# Patient Record
Sex: MALE | Race: WHITE | Employment: FULL TIME | URBAN - METROPOLITAN AREA
[De-identification: names, ages, dates, MRNs, and addresses within clinical notes are randomized per-mention and may not be internally consistent; named-entity substitution may affect disease eponyms.]

---

## 2019-03-28 ENCOUNTER — HOSPITAL ENCOUNTER (OUTPATIENT)
Dept: PREADMISSION TESTING | Age: 49
Discharge: HOME | End: 2019-04-01
Payer: COMMERCIAL

## 2019-03-28 VITALS
BODY MASS INDEX: 29 KG/M2 | DIASTOLIC BLOOD PRESSURE: 86 MMHG | HEART RATE: 92 BPM | WEIGHT: 184.8 LBS | OXYGEN SATURATION: 96 % | TEMPERATURE: 97.9 F | HEIGHT: 67 IN | SYSTOLIC BLOOD PRESSURE: 154 MMHG | RESPIRATION RATE: 16 BRPM

## 2019-03-28 PROBLEM — S83.242A TEAR OF MEDIAL MENISCUS OF LEFT KNEE: Status: ACTIVE | Noted: 2019-03-28

## 2019-03-28 LAB
ANION GAP SERPL CALCULATED.3IONS-SCNC: 13 MEQ/L (ref 9–15)
BUN BLDV-MCNC: 21 MG/DL (ref 6–20)
CALCIUM SERPL-MCNC: 9.2 MG/DL (ref 8.5–9.9)
CHLORIDE BLD-SCNC: 102 MEQ/L (ref 95–107)
CO2: 27 MEQ/L (ref 20–31)
CREAT SERPL-MCNC: 0.67 MG/DL (ref 0.7–1.2)
EKG ATRIAL RATE: 67 BPM
EKG P AXIS: 22 DEGREES
EKG P-R INTERVAL: 192 MS
EKG Q-T INTERVAL: 408 MS
EKG QRS DURATION: 98 MS
EKG QTC CALCULATION (BAZETT): 431 MS
EKG R AXIS: 31 DEGREES
EKG T AXIS: 17 DEGREES
EKG VENTRICULAR RATE: 67 BPM
GFR AFRICAN AMERICAN: >60
GFR NON-AFRICAN AMERICAN: >60
GLUCOSE BLD-MCNC: 147 MG/DL (ref 70–99)
HCT VFR BLD CALC: 40 % (ref 42–52)
HEMOGLOBIN: 14 G/DL (ref 14–18)
MCH RBC QN AUTO: 33.2 PG (ref 27–31.3)
MCHC RBC AUTO-ENTMCNC: 34.9 % (ref 33–37)
MCV RBC AUTO: 95.2 FL (ref 80–100)
PDW BLD-RTO: 12.9 % (ref 11.5–14.5)
PLATELET # BLD: 223 K/UL (ref 130–400)
POTASSIUM SERPL-SCNC: 3.4 MEQ/L (ref 3.4–4.9)
RBC # BLD: 4.2 M/UL (ref 4.7–6.1)
SODIUM BLD-SCNC: 142 MEQ/L (ref 135–144)
WBC # BLD: 5.3 K/UL (ref 4.8–10.8)

## 2019-03-28 PROCEDURE — 85027 COMPLETE CBC AUTOMATED: CPT

## 2019-03-28 PROCEDURE — 80048 BASIC METABOLIC PNL TOTAL CA: CPT

## 2019-03-28 PROCEDURE — 93005 ELECTROCARDIOGRAM TRACING: CPT

## 2019-03-28 RX ORDER — CARVEDILOL 6.25 MG/1
6.25 TABLET ORAL 2 TIMES DAILY WITH MEALS
COMMUNITY

## 2019-03-28 RX ORDER — AMLODIPINE BESYLATE 10 MG/1
10 TABLET ORAL DAILY
COMMUNITY

## 2019-03-28 RX ORDER — CEFAZOLIN SODIUM 2 G/50ML
2 SOLUTION INTRAVENOUS ONCE
Status: CANCELLED | OUTPATIENT
Start: 2019-04-09

## 2019-03-28 RX ORDER — INDOMETHACIN 75 MG/1
75 CAPSULE, EXTENDED RELEASE ORAL DAILY
COMMUNITY

## 2019-03-28 RX ORDER — EPLERENONE 50 MG/1
50 TABLET, FILM COATED ORAL DAILY
COMMUNITY

## 2019-03-28 RX ORDER — SODIUM CHLORIDE 0.9 % (FLUSH) 0.9 %
10 SYRINGE (ML) INJECTION PRN
Status: CANCELLED | OUTPATIENT
Start: 2019-04-09

## 2019-03-28 RX ORDER — ZOLPIDEM TARTRATE 10 MG/1
TABLET ORAL NIGHTLY PRN
COMMUNITY

## 2019-03-28 RX ORDER — SODIUM CHLORIDE, SODIUM LACTATE, POTASSIUM CHLORIDE, CALCIUM CHLORIDE 600; 310; 30; 20 MG/100ML; MG/100ML; MG/100ML; MG/100ML
INJECTION, SOLUTION INTRAVENOUS CONTINUOUS
Status: CANCELLED | OUTPATIENT
Start: 2019-04-09

## 2019-03-28 RX ORDER — ATORVASTATIN CALCIUM 20 MG/1
20 TABLET, FILM COATED ORAL DAILY
COMMUNITY

## 2019-03-28 RX ORDER — SODIUM CHLORIDE 0.9 % (FLUSH) 0.9 %
10 SYRINGE (ML) INJECTION EVERY 12 HOURS SCHEDULED
Status: CANCELLED | OUTPATIENT
Start: 2019-04-09

## 2019-03-28 RX ORDER — LISINOPRIL 40 MG/1
40 TABLET ORAL DAILY
COMMUNITY

## 2019-03-28 RX ORDER — LIDOCAINE HYDROCHLORIDE 10 MG/ML
1 INJECTION, SOLUTION EPIDURAL; INFILTRATION; INTRACAUDAL; PERINEURAL
Status: CANCELLED | OUTPATIENT
Start: 2019-04-09 | End: 2019-04-09

## 2019-03-28 RX ORDER — CYCLOBENZAPRINE HCL 10 MG
10 TABLET ORAL 3 TIMES DAILY PRN
COMMUNITY

## 2019-03-28 RX ORDER — NAPROXEN SODIUM 220 MG
440 TABLET ORAL DAILY
COMMUNITY

## 2019-03-28 NOTE — PROGRESS NOTES
Dr. Catina Duong to complete H&P  Cardiology clearance to be completed per Dr. Kali Luz.    EKG on chart

## 2019-03-29 PROCEDURE — 93010 ELECTROCARDIOGRAM REPORT: CPT | Performed by: INTERNAL MEDICINE

## 2019-04-09 ENCOUNTER — ANESTHESIA (OUTPATIENT)
Dept: OPERATING ROOM | Age: 49
End: 2019-04-09
Payer: COMMERCIAL

## 2019-04-09 ENCOUNTER — HOSPITAL ENCOUNTER (OUTPATIENT)
Age: 49
Setting detail: OUTPATIENT SURGERY
Discharge: HOME | End: 2019-04-09
Attending: ORTHOPAEDIC SURGERY | Admitting: ORTHOPAEDIC SURGERY
Payer: COMMERCIAL

## 2019-04-09 ENCOUNTER — ANESTHESIA EVENT (OUTPATIENT)
Dept: OPERATING ROOM | Age: 49
End: 2019-04-09
Payer: COMMERCIAL

## 2019-04-09 VITALS — DIASTOLIC BLOOD PRESSURE: 55 MMHG | SYSTOLIC BLOOD PRESSURE: 96 MMHG | TEMPERATURE: 95.4 F | OXYGEN SATURATION: 95 %

## 2019-04-09 VITALS
OXYGEN SATURATION: 98 % | HEART RATE: 58 BPM | TEMPERATURE: 97.8 F | BODY MASS INDEX: 28.88 KG/M2 | RESPIRATION RATE: 12 BRPM | HEIGHT: 67 IN | WEIGHT: 184 LBS | DIASTOLIC BLOOD PRESSURE: 93 MMHG | SYSTOLIC BLOOD PRESSURE: 149 MMHG

## 2019-04-09 PROCEDURE — 7100000000 HC PACU RECOVERY - FIRST 15 MIN: Performed by: ORTHOPAEDIC SURGERY

## 2019-04-09 PROCEDURE — 3700000000 HC ANESTHESIA ATTENDED CARE: Performed by: ORTHOPAEDIC SURGERY

## 2019-04-09 PROCEDURE — 6360000002 HC RX W HCPCS: Performed by: NURSE PRACTITIONER

## 2019-04-09 PROCEDURE — 3700000001 HC ADD 15 MINUTES (ANESTHESIA): Performed by: ORTHOPAEDIC SURGERY

## 2019-04-09 PROCEDURE — 7100000001 HC PACU RECOVERY - ADDTL 15 MIN: Performed by: ORTHOPAEDIC SURGERY

## 2019-04-09 PROCEDURE — 2709999900 HC NON-CHARGEABLE SUPPLY: Performed by: ORTHOPAEDIC SURGERY

## 2019-04-09 PROCEDURE — 2500000003 HC RX 250 WO HCPCS: Performed by: ORTHOPAEDIC SURGERY

## 2019-04-09 PROCEDURE — 2580000003 HC RX 258

## 2019-04-09 PROCEDURE — 2500000003 HC RX 250 WO HCPCS: Performed by: NURSE PRACTITIONER

## 2019-04-09 PROCEDURE — 7100000011 HC PHASE II RECOVERY - ADDTL 15 MIN: Performed by: ORTHOPAEDIC SURGERY

## 2019-04-09 PROCEDURE — 3600000014 HC SURGERY LEVEL 4 ADDTL 15MIN: Performed by: ORTHOPAEDIC SURGERY

## 2019-04-09 PROCEDURE — 6370000000 HC RX 637 (ALT 250 FOR IP): Performed by: ANESTHESIOLOGY

## 2019-04-09 PROCEDURE — 6360000002 HC RX W HCPCS: Performed by: NURSE ANESTHETIST, CERTIFIED REGISTERED

## 2019-04-09 PROCEDURE — 2580000003 HC RX 258: Performed by: NURSE PRACTITIONER

## 2019-04-09 PROCEDURE — 3600000004 HC SURGERY LEVEL 4 BASE: Performed by: ORTHOPAEDIC SURGERY

## 2019-04-09 PROCEDURE — 2580000003 HC RX 258: Performed by: ORTHOPAEDIC SURGERY

## 2019-04-09 PROCEDURE — 7100000010 HC PHASE II RECOVERY - FIRST 15 MIN: Performed by: ORTHOPAEDIC SURGERY

## 2019-04-09 RX ORDER — ONDANSETRON 2 MG/ML
4 INJECTION INTRAMUSCULAR; INTRAVENOUS
Status: DISCONTINUED | OUTPATIENT
Start: 2019-04-09 | End: 2019-04-09 | Stop reason: HOSPADM

## 2019-04-09 RX ORDER — LIDOCAINE HYDROCHLORIDE 10 MG/ML
1 INJECTION, SOLUTION EPIDURAL; INFILTRATION; INTRACAUDAL; PERINEURAL
Status: COMPLETED | OUTPATIENT
Start: 2019-04-09 | End: 2019-04-09

## 2019-04-09 RX ORDER — MORPHINE SULFATE 4 MG/ML
4 INJECTION, SOLUTION INTRAMUSCULAR; INTRAVENOUS
Status: DISCONTINUED | OUTPATIENT
Start: 2019-04-09 | End: 2019-04-09 | Stop reason: HOSPADM

## 2019-04-09 RX ORDER — HYDROCODONE BITARTRATE AND ACETAMINOPHEN 5; 325 MG/1; MG/1
1 TABLET ORAL PRN
Status: COMPLETED | OUTPATIENT
Start: 2019-04-09 | End: 2019-04-09

## 2019-04-09 RX ORDER — DIPHENHYDRAMINE HYDROCHLORIDE 50 MG/ML
12.5 INJECTION INTRAMUSCULAR; INTRAVENOUS
Status: DISCONTINUED | OUTPATIENT
Start: 2019-04-09 | End: 2019-04-09 | Stop reason: HOSPADM

## 2019-04-09 RX ORDER — DEXAMETHASONE SODIUM PHOSPHATE 10 MG/ML
INJECTION INTRAMUSCULAR; INTRAVENOUS PRN
Status: DISCONTINUED | OUTPATIENT
Start: 2019-04-09 | End: 2019-04-09 | Stop reason: SDUPTHER

## 2019-04-09 RX ORDER — SODIUM CHLORIDE 0.9 % (FLUSH) 0.9 %
10 SYRINGE (ML) INJECTION EVERY 12 HOURS SCHEDULED
Status: DISCONTINUED | OUTPATIENT
Start: 2019-04-09 | End: 2019-04-09 | Stop reason: HOSPADM

## 2019-04-09 RX ORDER — ONDANSETRON 2 MG/ML
4 INJECTION INTRAMUSCULAR; INTRAVENOUS EVERY 6 HOURS PRN
Status: DISCONTINUED | OUTPATIENT
Start: 2019-04-09 | End: 2019-04-09 | Stop reason: HOSPADM

## 2019-04-09 RX ORDER — SODIUM CHLORIDE, SODIUM LACTATE, POTASSIUM CHLORIDE, CALCIUM CHLORIDE 600; 310; 30; 20 MG/100ML; MG/100ML; MG/100ML; MG/100ML
INJECTION, SOLUTION INTRAVENOUS
Status: COMPLETED
Start: 2019-04-09 | End: 2019-04-09

## 2019-04-09 RX ORDER — LIDOCAINE HYDROCHLORIDE 10 MG/ML
INJECTION, SOLUTION EPIDURAL; INFILTRATION; INTRACAUDAL; PERINEURAL PRN
Status: DISCONTINUED | OUTPATIENT
Start: 2019-04-09 | End: 2019-04-09 | Stop reason: ALTCHOICE

## 2019-04-09 RX ORDER — METOCLOPRAMIDE HYDROCHLORIDE 5 MG/ML
10 INJECTION INTRAMUSCULAR; INTRAVENOUS
Status: DISCONTINUED | OUTPATIENT
Start: 2019-04-09 | End: 2019-04-09 | Stop reason: HOSPADM

## 2019-04-09 RX ORDER — HYDROCODONE BITARTRATE AND ACETAMINOPHEN 5; 325 MG/1; MG/1
2 TABLET ORAL PRN
Status: COMPLETED | OUTPATIENT
Start: 2019-04-09 | End: 2019-04-09

## 2019-04-09 RX ORDER — MAGNESIUM HYDROXIDE 1200 MG/15ML
LIQUID ORAL CONTINUOUS PRN
Status: COMPLETED | OUTPATIENT
Start: 2019-04-09 | End: 2019-04-09

## 2019-04-09 RX ORDER — MEPERIDINE HYDROCHLORIDE 25 MG/ML
12.5 INJECTION INTRAMUSCULAR; INTRAVENOUS; SUBCUTANEOUS EVERY 5 MIN PRN
Status: DISCONTINUED | OUTPATIENT
Start: 2019-04-09 | End: 2019-04-09 | Stop reason: HOSPADM

## 2019-04-09 RX ORDER — ONDANSETRON 2 MG/ML
INJECTION INTRAMUSCULAR; INTRAVENOUS PRN
Status: DISCONTINUED | OUTPATIENT
Start: 2019-04-09 | End: 2019-04-09 | Stop reason: SDUPTHER

## 2019-04-09 RX ORDER — FENTANYL CITRATE 50 UG/ML
50 INJECTION, SOLUTION INTRAMUSCULAR; INTRAVENOUS EVERY 10 MIN PRN
Status: DISCONTINUED | OUTPATIENT
Start: 2019-04-09 | End: 2019-04-09 | Stop reason: HOSPADM

## 2019-04-09 RX ORDER — LIDOCAINE HYDROCHLORIDE 10 MG/ML
1 INJECTION, SOLUTION EPIDURAL; INFILTRATION; INTRACAUDAL; PERINEURAL
Status: DISCONTINUED | OUTPATIENT
Start: 2019-04-09 | End: 2019-04-09 | Stop reason: HOSPADM

## 2019-04-09 RX ORDER — MIDAZOLAM HYDROCHLORIDE 1 MG/ML
INJECTION INTRAMUSCULAR; INTRAVENOUS
Status: DISCONTINUED
Start: 2019-04-09 | End: 2019-04-09 | Stop reason: HOSPADM

## 2019-04-09 RX ORDER — SODIUM CHLORIDE 0.9 % (FLUSH) 0.9 %
10 SYRINGE (ML) INJECTION PRN
Status: DISCONTINUED | OUTPATIENT
Start: 2019-04-09 | End: 2019-04-09 | Stop reason: HOSPADM

## 2019-04-09 RX ORDER — OXYCODONE HYDROCHLORIDE AND ACETAMINOPHEN 5; 325 MG/1; MG/1
2 TABLET ORAL EVERY 4 HOURS PRN
Status: DISCONTINUED | OUTPATIENT
Start: 2019-04-09 | End: 2019-04-09 | Stop reason: HOSPADM

## 2019-04-09 RX ORDER — PROPOFOL 10 MG/ML
INJECTION, EMULSION INTRAVENOUS PRN
Status: DISCONTINUED | OUTPATIENT
Start: 2019-04-09 | End: 2019-04-09 | Stop reason: SDUPTHER

## 2019-04-09 RX ORDER — DOCUSATE SODIUM 100 MG/1
100 CAPSULE, LIQUID FILLED ORAL 2 TIMES DAILY
Status: DISCONTINUED | OUTPATIENT
Start: 2019-04-09 | End: 2019-04-09 | Stop reason: HOSPADM

## 2019-04-09 RX ORDER — MORPHINE SULFATE 2 MG/ML
2 INJECTION, SOLUTION INTRAMUSCULAR; INTRAVENOUS
Status: DISCONTINUED | OUTPATIENT
Start: 2019-04-09 | End: 2019-04-09 | Stop reason: HOSPADM

## 2019-04-09 RX ORDER — OXYCODONE HYDROCHLORIDE AND ACETAMINOPHEN 5; 325 MG/1; MG/1
1 TABLET ORAL EVERY 4 HOURS PRN
Status: DISCONTINUED | OUTPATIENT
Start: 2019-04-09 | End: 2019-04-09 | Stop reason: HOSPADM

## 2019-04-09 RX ORDER — SODIUM CHLORIDE, SODIUM LACTATE, POTASSIUM CHLORIDE, CALCIUM CHLORIDE 600; 310; 30; 20 MG/100ML; MG/100ML; MG/100ML; MG/100ML
INJECTION, SOLUTION INTRAVENOUS CONTINUOUS
Status: DISCONTINUED | OUTPATIENT
Start: 2019-04-09 | End: 2019-04-09 | Stop reason: HOSPADM

## 2019-04-09 RX ORDER — FENTANYL CITRATE 50 UG/ML
INJECTION, SOLUTION INTRAMUSCULAR; INTRAVENOUS PRN
Status: DISCONTINUED | OUTPATIENT
Start: 2019-04-09 | End: 2019-04-09 | Stop reason: SDUPTHER

## 2019-04-09 RX ORDER — CEFAZOLIN SODIUM 2 G/50ML
2 SOLUTION INTRAVENOUS ONCE
Status: COMPLETED | OUTPATIENT
Start: 2019-04-09 | End: 2019-04-09

## 2019-04-09 RX ADMIN — PROPOFOL 200 MG: 10 INJECTION, EMULSION INTRAVENOUS at 07:36

## 2019-04-09 RX ADMIN — LIDOCAINE HYDROCHLORIDE 0.1 ML: 10 INJECTION, SOLUTION EPIDURAL; INFILTRATION; INTRACAUDAL; PERINEURAL at 06:34

## 2019-04-09 RX ADMIN — SODIUM CHLORIDE, POTASSIUM CHLORIDE, SODIUM LACTATE AND CALCIUM CHLORIDE 1000 ML: 600; 310; 30; 20 INJECTION, SOLUTION INTRAVENOUS at 06:35

## 2019-04-09 RX ADMIN — ONDANSETRON 4 MG: 2 INJECTION INTRAMUSCULAR; INTRAVENOUS at 07:42

## 2019-04-09 RX ADMIN — SODIUM CHLORIDE, POTASSIUM CHLORIDE, SODIUM LACTATE AND CALCIUM CHLORIDE: 600; 310; 30; 20 INJECTION, SOLUTION INTRAVENOUS at 08:05

## 2019-04-09 RX ADMIN — DEXAMETHASONE SODIUM PHOSPHATE 10 MG: 10 INJECTION INTRAMUSCULAR; INTRAVENOUS at 07:42

## 2019-04-09 RX ADMIN — CEFAZOLIN SODIUM 2 G: 2 SOLUTION INTRAVENOUS at 07:39

## 2019-04-09 RX ADMIN — FENTANYL CITRATE 25 MCG: 50 INJECTION, SOLUTION INTRAMUSCULAR; INTRAVENOUS at 07:40

## 2019-04-09 RX ADMIN — SODIUM CHLORIDE, POTASSIUM CHLORIDE, SODIUM LACTATE AND CALCIUM CHLORIDE: 600; 310; 30; 20 INJECTION, SOLUTION INTRAVENOUS at 07:29

## 2019-04-09 RX ADMIN — FENTANYL CITRATE 50 MCG: 50 INJECTION, SOLUTION INTRAMUSCULAR; INTRAVENOUS at 07:36

## 2019-04-09 RX ADMIN — LIDOCAINE HYDROCHLORIDE 80 MG: 20 INJECTION, SOLUTION INTRAVENOUS at 07:36

## 2019-04-09 RX ADMIN — FENTANYL CITRATE 25 MCG: 50 INJECTION, SOLUTION INTRAMUSCULAR; INTRAVENOUS at 07:38

## 2019-04-09 RX ADMIN — HYDROCODONE BITARTRATE AND ACETAMINOPHEN 1 TABLET: 5; 325 TABLET ORAL at 09:50

## 2019-04-09 ASSESSMENT — PULMONARY FUNCTION TESTS
PIF_VALUE: 9
PIF_VALUE: 5
PIF_VALUE: 0
PIF_VALUE: 2
PIF_VALUE: 14
PIF_VALUE: 2
PIF_VALUE: 3
PIF_VALUE: 4
PIF_VALUE: 5
PIF_VALUE: 3
PIF_VALUE: 2
PIF_VALUE: 3
PIF_VALUE: 4
PIF_VALUE: 0
PIF_VALUE: 6
PIF_VALUE: 4
PIF_VALUE: 4
PIF_VALUE: 19
PIF_VALUE: 2
PIF_VALUE: 3
PIF_VALUE: 11
PIF_VALUE: 5
PIF_VALUE: 5
PIF_VALUE: 11
PIF_VALUE: 4
PIF_VALUE: 5
PIF_VALUE: 3
PIF_VALUE: 3
PIF_VALUE: 12
PIF_VALUE: 6
PIF_VALUE: 5
PIF_VALUE: 0
PIF_VALUE: 2
PIF_VALUE: 0
PIF_VALUE: 1
PIF_VALUE: 1
PIF_VALUE: 7
PIF_VALUE: 3
PIF_VALUE: 5

## 2019-04-09 ASSESSMENT — PAIN SCALES - GENERAL: PAINLEVEL_OUTOF10: 6

## 2019-04-09 NOTE — OP NOTE
Janine [address]                      [address], [address] [facility]                                OPERATIVE REPORT    PATIENT NAME: Jorge Mtz                     :        1970  MED REC NO:   [id number]                            ROOM:  ACCOUNT NO:   [de-identified]                           ADMIT DATE: 2019  PROVIDER:     Breana Vasquez MD    DATE OF PROCEDURE:  2019    LOCATION:  [facility]. PREOPERATIVE DIAGNOSES:  1. Left knee medial meniscal tear. 2.  Left knee DJD. POSTOPERATIVE DIAGNOSES:  1. Left knee medial meniscal tear. 2.  Left knee DJD. PROCEDURES PERFORMED:  1. Left knee arthroscopy with partial medial meniscectomy. 2.  Left knee arthroscopic chondroplasty of patellofemoral joint. SURGEON:  Breana Vasquez MD.    ASSISTANT:  Cooper Briscoe PA-C was present throughout the entire case. Given the nature of the disease process and the procedure, a skilled  surgical first assistant was necessary during the case. The assistant  was necessary to hold retractors and manipulate the extremity during the  procedure. A certified scrub tech was at the back table managing the  instruments and supplies for the surgical case. ANESTHETIC:  LMA plus local.    COMPLICATIONS:  None. INDICATIONS:  The patient is a 78-year-old male with history of left  knee pain. He had recurrent mechanical symptoms and effusion, and  wished to proceed with the arthroscopic evaluation of his meniscal tear. The risks and benefits of surgery were noted with the patient and  family. These include infection, stiffness, nerve damage, continued  pain, as well as need for subsequent operations. We specifically  discussed the failure of arthroscopy to treat any of his underlying  arthritic condition. Understanding these options and limitations, he  elected to proceed.   Informed consent was obtained prior to arrival in  the operating room.    OPERATIVE PROCEDURE:  Upon arrival, the patient was identified. He was  transported from the stretcher to the operating table and placed in  supine position. An LMA was administered by the Anesthesia staff. Prior to the start of the case, 2 gm of Ancef was given intravenously. No tourniquet was used during the case. His left leg was prepped and  draped in the usual sterile fashion. A standard inferolateral  arthroscopy portal was established and the arthroscope was inserted into  the suprapatellar space. The patella  had some grade I changes and a  small area of grade II change laterally. There was some grade II and a  small area of grade III change centrally in the trochlea. There was  some grade II change noted up in majority of the medial femoral condyle. There was some grade I change in the medial tibial plateau. There was a  tear at the posterior aspect of the medial meniscus with the small flap  evident. Under direct visualization, an inferomedial portal was  established. The medial meniscal tear was resected back to stable issue  using upbiting forceps and shaver. Approximately 20%-25% of the medial  meniscus was removed. The anterior and posterior horn attachments  remained intact. The posterior compartment was normal.  The ACL and PCL  were intact. The lateral femoral condyle, lateral tibial plateau, and  lateral meniscus were normal.  A gentle chondroplasty was performed  medially as well as in the patellofemoral joint, removing any loose  fragments. The knee was then copiously irrigated through the shaver and  suctioned dry. Portal sites were closed with 3-0 nylon suture. All  sponge and needle counts were correct prior to the closure. A sterile  dressing was applied. He was awoken from his anesthetic and taken to  the recovery room in stable condition.         Joshua Magana MD    D: 04/09/2019 8:18:37       T: 04/09/2019 12:35:46     [email or web address]  Job#: [id number] Doc#: [id number]    CC:

## 2019-04-09 NOTE — ANESTHESIA POSTPROCEDURE EVALUATION
Department of Anesthesiology  Postprocedure Note    Patient: Hayley Sawyer  MRN: [id number]  YOB: 1970  Date of evaluation: 4/9/2019  Time:  8:15 AM     Procedure Summary     Date:  04/09/19 Room / Location:  [facility] OR [address] [place] [address] [place]    Anesthesia Start:  0729 Anesthesia Stop:      Procedure:  LEFT ARTHROSCOPY/ MEDIAL MENISCECTOMY KNEE, SUPINE (Left ) Diagnosis:  (LEFT KNEE MEDIAL MENISCUS TEAR)    Surgeon:  Yue Hicks MD Responsible Provider:  María Moreland MD    Anesthesia Type:  general ASA Status:  2          Anesthesia Type: general    Sherita Phase I: Sherita Score: 10    Sherita Phase II:      Last vitals: Reviewed and per EMR flowsheets.        Anesthesia Post Evaluation    Patient location during evaluation: bedside  Patient participation: complete - patient participated  Level of consciousness: awake and awake and alert  Pain score: 0  Airway patency: patent  Nausea & Vomiting: no nausea and no vomiting  Complications: no  Cardiovascular status: blood pressure returned to baseline and hemodynamically stable  Respiratory status: acceptable  Hydration status: euvolemic

## 2019-04-09 NOTE — ANESTHESIA PRE PROCEDURE
Department of Anesthesiology  Preprocedure Note       Name:  Ilsa Seip   Age:  50 y.o.  :  1970                                          MRN:  [id number]         Date:  2019      Surgeon: Karina Hunter):  Margo Hall MD    Procedure: LEFT ARTHROSCOPY/ MEDIAL MENISCECTOMY KNEE, SUPINE (Left )    Medications prior to admission:   Prior to Admission medications    Medication Sig Start Date End Date Taking? Authorizing Provider   amLODIPine (NORVASC) 10 MG tablet Take 10 mg by mouth daily   Yes Historical Provider, MD   atorvastatin (LIPITOR) 20 MG tablet Take 20 mg by mouth daily   Yes Historical Provider, MD   carvedilol (COREG) 6.25 MG tablet Take 6.25 mg by mouth 2 times daily (with meals)   Yes Historical Provider, MD   cyclobenzaprine (FLEXERIL) 10 MG tablet Take 10 mg by mouth 3 times daily as needed for Muscle spasms   Yes Historical Provider, MD   lisinopril (PRINIVIL;ZESTRIL) 40 MG tablet Take 40 mg by mouth daily   Yes Historical Provider, MD   zolpidem (AMBIEN) 10 MG tablet Take by mouth nightly as needed for Sleep.    Yes Historical Provider, MD   indomethacin (INDOCIN SR) 75 MG extended release capsule Take 75 mg by mouth daily   Yes Historical Provider, MD   eplerenone (INSPRA) 50 MG tablet Take 50 mg by mouth daily   Yes Historical Provider, MD   naproxen sodium (ALEVE) 220 MG tablet Take 440 mg by mouth daily   Yes Historical Provider, MD       Current medications:    Current Facility-Administered Medications   Medication Dose Route Frequency Provider Last Rate Last Dose    lactated ringers infusion   Intravenous Continuous FABIENNE Jha  mL/hr at 19 0635 1,000 mL at 19 0635    sodium chloride flush 0.9 % injection 10 mL  10 mL Intravenous 2 times per day FABIENNE Yan CNP        sodium chloride flush 0.9 % injection 10 mL  10 mL Intravenous PRN FABIENNE Jha CNP        ceFAZolin (ANCEF) 2 g in dextrose 3 % 50 mL IVPB (duplex)  2 g Intravenous Once [employer], FABIENNE - CNP        fentaNYL (SUBLIMAZE) injection 50 mcg  50 mcg Intravenous Q10 Min PRN Anne Brice MD        HYDROmorphone (DILAUDID) injection 0.5 mg  0.5 mg Intravenous Q10 Min PRN Anne Brice MD        HYDROcodone-acetaminophen [facility]) 5-325 MG per tablet 1 tablet  1 tablet Oral PRN Anne Brice MD        Or    HYDROcodone-acetaminophen [facility]) 5-325 MG per tablet 2 tablet  2 tablet Oral PRN Anne Brice MD        diphenhydrAMINE (BENADRYL) injection 12.5 mg  12.5 mg Intravenous Once PRN Anne Brice MD        ondansetron [facility]) injection 4 mg  4 mg Intravenous Once PRN Anne Brice MD        metoclopramide [facility]) injection 10 mg  10 mg Intravenous Once PRN Anne Brice MD        meperidine (DEMEROL) injection 12.5 mg  12.5 mg Intravenous Q5 Min PRN Anne Brice MD        lactated ringers infusion   Intravenous Continuous Anne Brice MD        sodium chloride flush 0.9 % injection 10 mL  10 mL Intravenous 2 times per day Anne Brice MD        sodium chloride flush 0.9 % injection 10 mL  10 mL Intravenous PRN Anne Brice MD        lidocaine PF 1 % injection 1 mL  1 mL Intradermal Once PRN Anne Brice MD           Allergies:     Allergies   Allergen Reactions    Vicodin [Hydrocodone-Acetaminophen] Nausea Only       Problem List:    Patient Active Problem List   Diagnosis Code    Tear of medial meniscus of left knee G24.376E       Past Medical History:        Diagnosis Date    Arthritis     Hyperlipidemia     on meds x 6 months    Hypertension     on meds > 20 yrs       Past Surgical History:        Procedure Laterality Date    COLONOSCOPY  2018    KIDNEY STONE SURGERY Right 2003       Social History:    Social History     Tobacco Use    Smoking status: Former Smoker     Start date: 1979     Last attempt to quit: 2018     Years since quittin.4    Smokeless tobacco: Never Used   Substance Use Topics    Alcohol use: Yes     Comment: twice a week                                Counseling given: Not Answered      Vital Signs (Current):   Vitals:    19 0610   BP: (!) 141/89   Pulse: 64   Resp: 20   Temp: 98.3 °F (36.8 °C)   TempSrc: Temporal   SpO2: 98%   Weight: 184 lb (83.5 kg)   Height: 5' 6.75\" (1.695 m)                                              BP Readings from Last 3 Encounters:   19 (!) 141/89   19 (!) 154/86       NPO Status: Time of last liquid consumption: 0000                        Time of last solid consumption: 0000                        Date of last liquid consumption: 19                        Date of last solid food consumption: 19    BMI:   Wt Readings from Last 3 Encounters:   19 184 lb (83.5 kg)   19 184 lb 12.8 oz (83.8 kg)     Body mass index is 29.03 kg/m². CBC:   Lab Results   Component Value Date    WBC 5.3 2019    RBC 4.20 2019    HGB 14.0 2019    HCT 40.0 2019    MCV 95.2 2019    RDW 12.9 2019     2019       CMP:   Lab Results   Component Value Date     2019    K 3.4 2019     2019    CO2 27 2019    BUN 21 2019    CREATININE 0.67 2019    GFRAA >60.0 2019    LABGLOM >60.0 2019    GLUCOSE 147 2019    CALCIUM 9.2 2019       POC Tests: No results for input(s): POCGLU, POCNA, POCK, POCCL, POCBUN, POCHEMO, POCHCT in the last 72 hours.     Coags: No results found for: PROTIME, INR, APTT    HCG (If Applicable): No results found for: PREGTESTUR, PREGSERUM, HCG, HCGQUANT     ABGs: No results found for: PHART, PO2ART, MOT1HVM, OCS9QLM, BEART, D4HXHXEH     Type & Screen (If Applicable):  No results found for: LABABO, [address]    Anesthesia Evaluation  Patient summary reviewed and Nursing notes reviewed no history of anesthetic complications: Airway: Mallampati: II  TM distance: >3 FB   Neck ROM: full  Mouth opening: > = 3 FB Dental: normal exam         Pulmonary:Negative Pulmonary ROS and normal exam                               Cardiovascular:    (+) hypertension: no interval change,       ECG reviewed               Beta Blocker:  Dose within 24 Hrs         Neuro/Psych:   Negative Neuro/Psych ROS              GI/Hepatic/Renal: Neg GI/Hepatic/Renal ROS            Endo/Other: Negative Endo/Other ROS             Pt had PAT visit. Abdominal:           Vascular: negative vascular ROS. Anesthesia Plan      general     ASA 2       Induction: intravenous. MIPS: Postoperative opioids intended and Prophylactic antiemetics administered. Anesthetic plan and risks discussed with patient. Plan discussed with CRNA.     Attending anesthesiologist reviewed and agrees with Pre Eval content              Nimisha Aguila MD   4/9/2019

## 2023-10-18 PROBLEM — R94.31 ABNORMAL EKG: Status: ACTIVE | Noted: 2023-10-18

## 2023-10-18 PROBLEM — I10 HYPERTENSION: Status: ACTIVE | Noted: 2023-10-18

## 2023-10-18 PROBLEM — T50.905A DRUG-INDUCED GYNECOMASTIA: Status: ACTIVE | Noted: 2023-10-18

## 2023-10-18 PROBLEM — N62 DRUG-INDUCED GYNECOMASTIA: Status: ACTIVE | Noted: 2023-10-18

## 2023-10-18 PROBLEM — G47.30 SLEEP APNEA: Status: ACTIVE | Noted: 2023-10-18

## 2023-10-18 PROBLEM — R53.83 FATIGUE: Status: ACTIVE | Noted: 2023-10-18

## 2023-10-18 PROBLEM — R79.89 ABNORMAL ALDOSTERONE TO RENIN RATIO: Status: ACTIVE | Noted: 2023-10-18

## 2023-10-18 RX ORDER — POTASSIUM CHLORIDE 20 MEQ/1
2 TABLET, EXTENDED RELEASE ORAL DAILY
COMMUNITY
Start: 2021-09-28

## 2023-10-18 RX ORDER — EPLERENONE 25 MG/1
1 TABLET, FILM COATED ORAL DAILY
COMMUNITY

## 2023-10-18 RX ORDER — LISINOPRIL 40 MG/1
1 TABLET ORAL DAILY
COMMUNITY
Start: 2022-08-11

## 2023-10-18 RX ORDER — HYDROCHLOROTHIAZIDE 12.5 MG/1
12.5 TABLET ORAL DAILY
COMMUNITY
End: 2024-05-07 | Stop reason: SDUPTHER

## 2023-10-18 RX ORDER — HYDRALAZINE HYDROCHLORIDE 100 MG/1
1 TABLET, FILM COATED ORAL 2 TIMES DAILY
COMMUNITY
Start: 2022-09-21

## 2023-10-18 RX ORDER — CLONIDINE HYDROCHLORIDE 0.2 MG/1
1 TABLET ORAL DAILY
COMMUNITY
End: 2024-03-28 | Stop reason: SDUPTHER

## 2023-10-18 RX ORDER — CARVEDILOL 6.25 MG/1
1 TABLET ORAL 2 TIMES DAILY
COMMUNITY
Start: 2021-09-16

## 2023-10-18 RX ORDER — CYCLOBENZAPRINE HCL 10 MG
1 TABLET ORAL 2 TIMES DAILY PRN
COMMUNITY

## 2023-10-18 RX ORDER — ZOLPIDEM TARTRATE 5 MG/1
1 TABLET ORAL NIGHTLY
COMMUNITY
Start: 2022-02-02

## 2023-10-18 RX ORDER — AMLODIPINE BESYLATE 10 MG/1
1 TABLET ORAL DAILY
COMMUNITY
Start: 2022-09-21

## 2024-03-05 ENCOUNTER — APPOINTMENT (OUTPATIENT)
Dept: CARDIOLOGY | Facility: CLINIC | Age: 54
End: 2024-03-05
Payer: COMMERCIAL

## 2024-03-14 ENCOUNTER — OFFICE VISIT (OUTPATIENT)
Dept: CARDIOLOGY | Facility: CLINIC | Age: 54
End: 2024-03-14
Payer: COMMERCIAL

## 2024-03-14 VITALS
HEIGHT: 68 IN | BODY MASS INDEX: 25.31 KG/M2 | WEIGHT: 167 LBS | SYSTOLIC BLOOD PRESSURE: 116 MMHG | HEART RATE: 60 BPM | DIASTOLIC BLOOD PRESSURE: 86 MMHG

## 2024-03-14 DIAGNOSIS — G45.9 TIA (TRANSIENT ISCHEMIC ATTACK): ICD-10-CM

## 2024-03-14 DIAGNOSIS — Z13.220 LIPID SCREENING: ICD-10-CM

## 2024-03-14 DIAGNOSIS — I10 HYPERTENSION, UNSPECIFIED TYPE: ICD-10-CM

## 2024-03-14 DIAGNOSIS — Z87.891 FORMER SMOKER: ICD-10-CM

## 2024-03-14 DIAGNOSIS — R42 DIZZINESS: ICD-10-CM

## 2024-03-14 PROCEDURE — 3008F BODY MASS INDEX DOCD: CPT | Performed by: INTERNAL MEDICINE

## 2024-03-14 PROCEDURE — 3074F SYST BP LT 130 MM HG: CPT | Performed by: INTERNAL MEDICINE

## 2024-03-14 PROCEDURE — 1036F TOBACCO NON-USER: CPT | Performed by: INTERNAL MEDICINE

## 2024-03-14 PROCEDURE — 3079F DIAST BP 80-89 MM HG: CPT | Performed by: INTERNAL MEDICINE

## 2024-03-14 PROCEDURE — 99214 OFFICE O/P EST MOD 30 MIN: CPT | Performed by: INTERNAL MEDICINE

## 2024-03-14 RX ORDER — ASPIRIN 81 MG/1
81 TABLET ORAL DAILY
Qty: 90 TABLET | Refills: 3 | Status: SHIPPED | OUTPATIENT
Start: 2024-03-14 | End: 2025-03-14

## 2024-03-14 ASSESSMENT — ENCOUNTER SYMPTOMS
IRREGULAR HEARTBEAT: 1
DIZZINESS: 1

## 2024-03-14 NOTE — PROGRESS NOTES
"Subjective   Luis Armando Baez is a 53 y.o. male       Chief Complaint    Follow-up          HPI   Patient is in the office for follow-up for the problems noted below.  He complains of visual abnormalities in the right eye for which she saw the eye doctors and was told he had what might be called small strokes unfortunately I have no record of this patient getting MRI or CTA or carotid scans.  He is not on antiplatelet therapy.  He describes episode of dizziness and palpitations with a headache that happen 2-3 times per month.  It raises question of whether the patient is having cardiac arrhythmias such as atrial fibrillation.  His pressure is under control multiple medication which have been well-tolerated and does not demonstrate any evidence of fourth orthostatic blood pressure changes.    Assessment/recommendations:     1-hypertension on multiple medications currently under control. No changes are needed..y  2-abnormal EKG with normal stress perfusion study 2019  3-prediabetes, his PCP is monitoring his blood sugar. Patient lost significant weight which helped this matter significantly.  4-intermittent palpitations and dizziness with headache and with reported TIAs, the patient was advised to start baby aspirin and obtain carotid scan and 30-day event monitor.  5-previous history of drug-induced gynecomastia which resolved by discontinuation of Aldactone, doing well on eplerenone     Review of Systems   Cardiovascular:  Positive for irregular heartbeat.   Neurological:  Positive for dizziness.   All other systems reviewed and are negative.           Vitals:    03/14/24 0906   BP: 116/86   BP Location: Right arm   Patient Position: Sitting   Pulse: 60   Weight: 75.8 kg (167 lb)   Height: 1.727 m (5' 8\")        Objective   Physical Exam  Constitutional:       Appearance: Normal appearance.   HENT:      Nose: Nose normal.   Neck:      Vascular: No carotid bruit.   Cardiovascular:      Rate and Rhythm: Normal rate. "      Pulses: Normal pulses.      Heart sounds: Normal heart sounds.   Pulmonary:      Effort: Pulmonary effort is normal.   Abdominal:      General: Bowel sounds are normal.      Palpations: Abdomen is soft.   Musculoskeletal:         General: Normal range of motion.      Cervical back: Normal range of motion.      Right lower leg: No edema.      Left lower leg: No edema.   Skin:     General: Skin is warm and dry.   Neurological:      General: No focal deficit present.      Mental Status: He is alert.   Psychiatric:         Mood and Affect: Mood normal.         Behavior: Behavior normal.         Thought Content: Thought content normal.         Judgment: Judgment normal.         Allergies  Hydrocodone-acetaminophen and Spironolactone     Current Medications    Current Outpatient Medications:     amLODIPine (Norvasc) 10 mg tablet, Take 1 tablet (10 mg) by mouth once daily., Disp: , Rfl:     carvedilol (Coreg) 6.25 mg tablet, Take 1 tablet (6.25 mg) by mouth 2 times a day., Disp: , Rfl:     cloNIDine (Catapres) 0.2 mg tablet, Take 1 tablet (0.2 mg) by mouth once daily., Disp: , Rfl:     cyclobenzaprine (Flexeril) 10 mg tablet, Take 1 tablet (10 mg) by mouth 2 times a day as needed for muscle spasms., Disp: , Rfl:     eplerenone (Inspra) 25 mg tablet, Take 1 tablet (25 mg) by mouth once daily., Disp: , Rfl:     hydrALAZINE (Apresoline) 100 mg tablet, Take 1 tablet (100 mg) by mouth twice a day., Disp: , Rfl:     hydroCHLOROthiazide (HYDRODiuril) 12.5 mg tablet, Take 1 tablet (12.5 mg) by mouth once daily., Disp: , Rfl:     lisinopril 40 mg tablet, Take 1 tablet (40 mg) by mouth once daily., Disp: , Rfl:     potassium chloride CR 20 mEq ER tablet, Take 2 tablets (40 mEq) by mouth once daily., Disp: , Rfl:     zolpidem (Ambien) 5 mg tablet, Take 1 tablet (5 mg) by mouth once daily at bedtime., Disp: , Rfl:     aspirin 81 mg EC tablet, Take 1 tablet (81 mg) by mouth once daily., Disp: 90 tablet, Rfl: 3                      Assessment/Plan   1. Hypertension, unspecified type  Follow Up In Cardiology    Basic Metabolic Panel    Basic Metabolic Panel      2. Dizziness  aspirin 81 mg EC tablet    Holter Or Event Cardiac Monitor    Vascular US Carotid Artery Duplex Bilateral      3. TIA (transient ischemic attack)  Vascular US Carotid Artery Duplex Bilateral      4. BMI 25.0-25.9,adult        5. Former smoker        6. Lipid screening  Lipid Panel    Lipid Panel               Scribe Attestation  By signing my name below, IAneta LPN, Scribe   attest that this documentation has been prepared under the direction and in the presence of Gogo Chapman MD.     Provider Attestation - Scribe documentation    All medical record entries made by the Scribe were at my direction and personally dictated by me. I have reviewed the chart and agree that the record accurately reflects my personal performance of the history, physical exam, discussion and plan.

## 2024-03-14 NOTE — PATIENT INSTRUCTIONS
Please bring all medicines, vitamins, and herbal supplements with you when you come to the office.    Prescriptions will not be filled unless you are compliant with your follow up appointments or have a follow up appointment scheduled as per instruction of your physician. Refills should be requested at the time of your visit.     BMI was above normal measurement. Current weight: 75.8 kg (167 lb)  Weight change since last visit (-) denotes wt loss 7 lbs   Weight loss needed to achieve BMI 25: 2.9 Lbs  Weight loss needed to achieve BMI 30: -29.9 Lbs  Provided instructions on dietary changes  Provided instructions on exercise.

## 2024-03-14 NOTE — LETTER
March 14, 2024     Maria Antonia Hemphill, APRN-CNP  [address] [place] [address]    Patient: Luis Armando Baez   YOB: 1970   Date of Visit: 3/14/2024       Dear Dr. Maria Antonia Hemphill, APRN-CNP:    Thank you for referring Luis Armando Baez to me for evaluation. Below are my notes for this consultation.  If you have questions, please do not hesitate to call me. I look forward to following your patient along with you.       Sincerely,     Gogo Chapman MD      CC: No Recipients  ______________________________________________________________________________________    Subjective   Luis Armando Baez is a 53 y.o. male       Chief Complaint    Follow-up          HPI   Patient is in the office for follow-up for the problems noted below.  He complains of visual abnormalities in the right eye for which she saw the eye doctors and was told he had what might be called small strokes unfortunately I have no record of this patient getting MRI or CTA or carotid scans.  He is not on antiplatelet therapy.  He describes episode of dizziness and palpitations with a headache that happen 2-3 times per month.  It raises question of whether the patient is having cardiac arrhythmias such as atrial fibrillation.  His pressure is under control multiple medication which have been well-tolerated and does not demonstrate any evidence of fourth orthostatic blood pressure changes.    Assessment/recommendations:     1-hypertension on multiple medications currently under control. No changes are needed..y  2-abnormal EKG with normal stress perfusion study 2019  3-prediabetes, his PCP is monitoring his blood sugar. Patient lost significant weight which helped this matter significantly.  4-intermittent palpitations and dizziness with headache and with reported TIAs, the patient was advised to start baby aspirin and obtain carotid scan and 30-day event monitor.  5-previous history of drug-induced gynecomastia which resolved  "by discontinuation of Aldactone, doing well on eplerenone     Review of Systems   Cardiovascular:  Positive for irregular heartbeat.   Neurological:  Positive for dizziness.   All other systems reviewed and are negative.           Vitals:    03/14/24 0906   BP: 116/86   BP Location: Right arm   Patient Position: Sitting   Pulse: 60   Weight: 75.8 kg (167 lb)   Height: 1.727 m (5' 8\")        Objective   Physical Exam  Constitutional:       Appearance: Normal appearance.   HENT:      Nose: Nose normal.   Neck:      Vascular: No carotid bruit.   Cardiovascular:      Rate and Rhythm: Normal rate.      Pulses: Normal pulses.      Heart sounds: Normal heart sounds.   Pulmonary:      Effort: Pulmonary effort is normal.   Abdominal:      General: Bowel sounds are normal.      Palpations: Abdomen is soft.   Musculoskeletal:         General: Normal range of motion.      Cervical back: Normal range of motion.      Right lower leg: No edema.      Left lower leg: No edema.   Skin:     General: Skin is warm and dry.   Neurological:      General: No focal deficit present.      Mental Status: He is alert.   Psychiatric:         Mood and Affect: Mood normal.         Behavior: Behavior normal.         Thought Content: Thought content normal.         Judgment: Judgment normal.         Allergies  Hydrocodone-acetaminophen and Spironolactone     Current Medications    Current Outpatient Medications:   •  amLODIPine (Norvasc) 10 mg tablet, Take 1 tablet (10 mg) by mouth once daily., Disp: , Rfl:   •  carvedilol (Coreg) 6.25 mg tablet, Take 1 tablet (6.25 mg) by mouth 2 times a day., Disp: , Rfl:   •  cloNIDine (Catapres) 0.2 mg tablet, Take 1 tablet (0.2 mg) by mouth once daily., Disp: , Rfl:   •  cyclobenzaprine (Flexeril) 10 mg tablet, Take 1 tablet (10 mg) by mouth 2 times a day as needed for muscle spasms., Disp: , Rfl:   •  eplerenone (Inspra) 25 mg tablet, Take 1 tablet (25 mg) by mouth once daily., Disp: , Rfl:   •  hydrALAZINE " (Apresoline) 100 mg tablet, Take 1 tablet (100 mg) by mouth twice a day., Disp: , Rfl:   •  hydroCHLOROthiazide (HYDRODiuril) 12.5 mg tablet, Take 1 tablet (12.5 mg) by mouth once daily., Disp: , Rfl:   •  lisinopril 40 mg tablet, Take 1 tablet (40 mg) by mouth once daily., Disp: , Rfl:   •  potassium chloride CR 20 mEq ER tablet, Take 2 tablets (40 mEq) by mouth once daily., Disp: , Rfl:   •  zolpidem (Ambien) 5 mg tablet, Take 1 tablet (5 mg) by mouth once daily at bedtime., Disp: , Rfl:   •  aspirin 81 mg EC tablet, Take 1 tablet (81 mg) by mouth once daily., Disp: 90 tablet, Rfl: 3                     Assessment/Plan   1. Hypertension, unspecified type  Follow Up In Cardiology    Basic Metabolic Panel    Basic Metabolic Panel      2. Dizziness  aspirin 81 mg EC tablet    Holter Or Event Cardiac Monitor    Vascular US Carotid Artery Duplex Bilateral      3. TIA (transient ischemic attack)  Vascular US Carotid Artery Duplex Bilateral      4. BMI 25.0-25.9,adult        5. Former smoker        6. Lipid screening  Lipid Panel    Lipid Panel               Scribe Attestation  By signing my name below, I, Bettina Pichardo LPN   attest that this documentation has been prepared under the direction and in the presence of Gogo Chapman MD.     Provider Attestation - Scribe documentation    All medical record entries made by the Scribe were at my direction and personally dictated by me. I have reviewed the chart and agree that the record accurately reflects my personal performance of the history, physical exam, discussion and plan.

## 2024-03-20 LAB
NON-UH HIE CHOL/HDL RATIO: 4.2
NON-UH HIE CHOLESTEROL: 150 MG/DL (ref 140–200)
NON-UH HIE HDL CHOLESTEROL: 36 MG/DL (ref 23–92)
NON-UH HIE LDL CHOLESTEROL,CALCULATED: 89 MG/DL (ref 0–100)
NON-UH HIE TRIGLYCERIDE W/REFLEX: 125 MG/DL (ref 0–149)
NON-UH HIE VLDL CHOLESTEROL: 25 MG/DL

## 2024-03-27 ENCOUNTER — ANCILLARY PROCEDURE (OUTPATIENT)
Dept: CARDIOLOGY | Facility: CLINIC | Age: 54
End: 2024-03-27
Payer: COMMERCIAL

## 2024-03-27 ENCOUNTER — HOSPITAL ENCOUNTER (OUTPATIENT)
Dept: CARDIOLOGY | Facility: CLINIC | Age: 54
Discharge: HOME | End: 2024-03-27
Payer: COMMERCIAL

## 2024-03-27 DIAGNOSIS — G45.9 TIA (TRANSIENT ISCHEMIC ATTACK): ICD-10-CM

## 2024-03-27 DIAGNOSIS — R42 DIZZINESS: ICD-10-CM

## 2024-03-27 PROCEDURE — 93880 EXTRACRANIAL BILAT STUDY: CPT | Performed by: INTERNAL MEDICINE

## 2024-03-27 PROCEDURE — 93272 ECG/REVIEW INTERPRET ONLY: CPT | Performed by: INTERNAL MEDICINE

## 2024-03-27 PROCEDURE — 93880 EXTRACRANIAL BILAT STUDY: CPT

## 2024-03-28 DIAGNOSIS — I10 HYPERTENSION, UNSPECIFIED TYPE: ICD-10-CM

## 2024-03-28 RX ORDER — CLONIDINE HYDROCHLORIDE 0.2 MG/1
0.2 TABLET ORAL DAILY
Qty: 90 TABLET | Refills: 3 | Status: SHIPPED | OUTPATIENT
Start: 2024-03-28 | End: 2025-03-28

## 2024-05-07 DIAGNOSIS — I10 HYPERTENSION, UNSPECIFIED TYPE: ICD-10-CM

## 2024-05-07 RX ORDER — HYDROCHLOROTHIAZIDE 12.5 MG/1
12.5 TABLET ORAL DAILY
Qty: 90 TABLET | Refills: 3 | Status: SHIPPED | OUTPATIENT
Start: 2024-05-07 | End: 2025-05-07

## 2024-07-15 ENCOUNTER — APPOINTMENT (OUTPATIENT)
Dept: CARDIOLOGY | Facility: CLINIC | Age: 54
End: 2024-07-15
Payer: COMMERCIAL

## 2024-07-15 VITALS
HEART RATE: 72 BPM | DIASTOLIC BLOOD PRESSURE: 96 MMHG | HEIGHT: 66 IN | SYSTOLIC BLOOD PRESSURE: 170 MMHG | WEIGHT: 164.2 LBS | BODY MASS INDEX: 26.39 KG/M2

## 2024-07-15 DIAGNOSIS — I1A.0 RESISTANT HYPERTENSION: ICD-10-CM

## 2024-07-15 DIAGNOSIS — Z87.891 FORMER SMOKER: ICD-10-CM

## 2024-07-15 DIAGNOSIS — R42 DIZZINESS: Primary | ICD-10-CM

## 2024-07-15 DIAGNOSIS — Z86.73 HISTORY OF TIA (TRANSIENT ISCHEMIC ATTACK): ICD-10-CM

## 2024-07-15 PROCEDURE — 3077F SYST BP >= 140 MM HG: CPT | Performed by: INTERNAL MEDICINE

## 2024-07-15 PROCEDURE — 3079F DIAST BP 80-89 MM HG: CPT | Performed by: INTERNAL MEDICINE

## 2024-07-15 PROCEDURE — 3008F BODY MASS INDEX DOCD: CPT | Performed by: INTERNAL MEDICINE

## 2024-07-15 PROCEDURE — 99214 OFFICE O/P EST MOD 30 MIN: CPT | Performed by: INTERNAL MEDICINE

## 2024-07-15 PROCEDURE — 1036F TOBACCO NON-USER: CPT | Performed by: INTERNAL MEDICINE

## 2024-07-15 RX ORDER — HYDRALAZINE HYDROCHLORIDE 50 MG/1
50 TABLET, FILM COATED ORAL 2 TIMES DAILY
Qty: 180 TABLET | Refills: 3 | Status: SHIPPED | OUTPATIENT
Start: 2024-07-15 | End: 2025-07-15

## 2024-07-15 RX ORDER — INDAPAMIDE 2.5 MG/1
2.5 TABLET ORAL EVERY MORNING
Qty: 90 TABLET | Refills: 3 | Status: SHIPPED | OUTPATIENT
Start: 2024-07-15 | End: 2025-07-15

## 2024-07-15 RX ORDER — NEBIVOLOL 10 MG/1
10 TABLET ORAL DAILY
Qty: 90 TABLET | Refills: 3 | Status: SHIPPED | OUTPATIENT
Start: 2024-07-15 | End: 2025-07-15

## 2024-07-15 RX ORDER — NIFEDIPINE 10 MG/1
10 CAPSULE ORAL 3 TIMES DAILY
Qty: 270 CAPSULE | Refills: 3 | Status: SHIPPED | OUTPATIENT
Start: 2024-07-15 | End: 2025-07-15

## 2024-07-15 RX ORDER — VALSARTAN 160 MG/1
160 TABLET ORAL DAILY
Qty: 90 TABLET | Refills: 3 | Status: SHIPPED | OUTPATIENT
Start: 2024-07-15 | End: 2025-07-15

## 2024-07-15 RX ORDER — CLONIDINE HYDROCHLORIDE 0.1 MG/1
0.1 TABLET ORAL DAILY
Qty: 90 TABLET | Refills: 3 | Status: SHIPPED | OUTPATIENT
Start: 2024-07-15 | End: 2025-07-15

## 2024-07-15 ASSESSMENT — ENCOUNTER SYMPTOMS
DYSPNEA ON EXERTION: 1
DIZZINESS: 1

## 2024-07-15 NOTE — PROGRESS NOTES
Subjective   Luis Armando Baez is a 54 y.o. male       Chief Complaint    Follow-up          HPI   Patient is in the office for follow-up for the problems noted below.  He had a carotid scan that we did for dizziness came back normal.  He had event monitor which revealed no cardiac arrhythmias.  He stopped taking all his hypertension therapy for a while and his symptoms completely went away.  He became significantly hypotensive and went back on his medications except for the amlodipine since he ran out of it.  He is hypertensive in the office today but feels well.  Since he went back on his medication he went again has started having his frequent events of dizziness.  This is clearly a side effect of all of his medications.  His cardiac and pulmonary examinations were normal.  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  He continues to have dizziness that seems to be related to one of his medications.  I decided to rearrange his medications, we will switch to nebivolol and indapamide, reduce clonidine and switch from ACE inhibitor to ARB.  I will reduce the dose of hydralazine as well.  I pointed out to the patient that we may never find a combination of therapy that would be completely effective and not associated with side effects.  3-prediabetes, his PCP is monitoring his blood sugar. Patient lost significant weight which helped this matter significantly.  4-intermittent palpitations and dizziness with headache and with reported TIAs, investigations revealed no carotid disease and normal event monitor.  This is likely caused by his hypertension therapy.  5-previous history of drug-induced gynecomastia which resolved by discontinuation of Aldactone, doing well on eplerenone     Review of Systems   Cardiovascular:  Positive for dyspnea on exertion.   Neurological:  Positive for dizziness.  "           Vitals:    07/15/24 1322 07/15/24 1343   BP: 146/86 (!) 170/96   BP Location: Left arm Left arm   Patient Position: Sitting Sitting   Pulse: 72    Weight: 74.5 kg (164 lb 3.2 oz)    Height: 1.676 m (5' 6\")         Objective   Physical Exam  Constitutional:       Appearance: Normal appearance.   HENT:      Nose: Nose normal.   Neck:      Vascular: No carotid bruit.   Cardiovascular:      Rate and Rhythm: Normal rate.      Pulses: Normal pulses.      Heart sounds: Normal heart sounds.   Pulmonary:      Effort: Pulmonary effort is normal.   Abdominal:      General: Bowel sounds are normal.      Palpations: Abdomen is soft.   Musculoskeletal:         General: Normal range of motion.      Cervical back: Normal range of motion.      Right lower leg: No edema.      Left lower leg: No edema.   Skin:     General: Skin is warm and dry.   Neurological:      General: No focal deficit present.      Mental Status: He is alert.   Psychiatric:         Mood and Affect: Mood normal.         Behavior: Behavior normal.         Thought Content: Thought content normal.         Judgment: Judgment normal.         Allergies  Hydrocodone-acetaminophen and Spironolactone     Current Medications    Current Outpatient Medications:     aspirin 81 mg EC tablet, Take 1 tablet (81 mg) by mouth once daily., Disp: 90 tablet, Rfl: 3    cyclobenzaprine (Flexeril) 10 mg tablet, Take 1 tablet (10 mg) by mouth 2 times a day as needed for muscle spasms., Disp: , Rfl:     eplerenone (Inspra) 25 mg tablet, Take 1 tablet (25 mg) by mouth once daily., Disp: , Rfl:     potassium chloride CR 20 mEq ER tablet, Take 2 tablets (40 mEq) by mouth once daily., Disp: , Rfl:     zolpidem (Ambien) 5 mg tablet, Take 1 tablet (5 mg) by mouth once daily at bedtime., Disp: , Rfl:     cloNIDine (Catapres) 0.1 mg tablet, Take 1 tablet (0.1 mg) by mouth once daily., Disp: 90 tablet, Rfl: 3    hydrALAZINE (Apresoline) 50 mg tablet, Take 1 tablet (50 mg) by mouth 2 " times a day., Disp: 180 tablet, Rfl: 3    indapamide (Lozol) 2.5 mg tablet, Take 1 tablet (2.5 mg) by mouth once daily in the morning., Disp: 90 tablet, Rfl: 3    nebivolol (Bystolic) 10 mg tablet, Take 1 tablet (10 mg) by mouth once daily., Disp: 90 tablet, Rfl: 3    NIFEdipine (Procardia) 10 mg capsule, Take 1 capsule (10 mg) by mouth 3 times a day., Disp: 270 capsule, Rfl: 3    valsartan (Diovan) 160 mg tablet, Take 1 tablet (160 mg) by mouth once daily., Disp: 90 tablet, Rfl: 3                     Assessment/Plan   1. Dizziness        2. Resistant hypertension  Follow Up In Cardiology    NIFEdipine (Procardia) 10 mg capsule    nebivolol (Bystolic) 10 mg tablet    indapamide (Lozol) 2.5 mg tablet    valsartan (Diovan) 160 mg tablet    Follow Up In Cardiology    cloNIDine (Catapres) 0.1 mg tablet    hydrALAZINE (Apresoline) 50 mg tablet    Follow Up In Cardiology      3. BMI 26.0-26.9,adult        4. Former smoker        5. History of TIA (transient ischemic attack)                 Scribe Attestation  By signing my name below, I, Bettina Horn LPN   attest that this documentation has been prepared under the direction and in the presence of Gogo Chapman MD.     Provider Attestation - Scribe documentation    All medical record entries made by the Scribe were at my direction and personally dictated by me. I have reviewed the chart and agree that the record accurately reflects my personal performance of the history, physical exam, discussion and plan.

## 2024-07-15 NOTE — PATIENT INSTRUCTIONS
Please bring all medicines, vitamins, and herbal supplements with you when you come to the office.    Prescriptions will not be filled unless you are compliant with your follow up appointments or have a follow up appointment scheduled as per instruction of your physician. Refills should be requested at the time of your visit.     BMI was above normal measurement. Current weight: 74.5 kg (164 lb 3.2 oz)  Weight change since last visit (-) denotes wt loss -2.8 lbs   Weight loss needed to achieve BMI 25: 9.6 Lbs  Weight loss needed to achieve BMI 30: -21.3 Lbs  Provided instructions on dietary changes.

## 2024-07-15 NOTE — LETTER
July 15, 2024     Maria Antonia Hemphill, APRN-CNP  [address] [place] [address]    Patient: Luis Armando Baez   YOB: 1970   Date of Visit: 7/15/2024       Dear Dr. Maria Antonia Hemphill, APRN-CNP:    Thank you for referring Luis Armando Baez to me for evaluation. Below are my notes for this consultation.  If you have questions, please do not hesitate to call me. I look forward to following your patient along with you.       Sincerely,     Gogo Chapman MD      CC: No Recipients  ______________________________________________________________________________________    Subjective   Luis Armando Baez is a 54 y.o. male       Chief Complaint    Follow-up          HPI   Patient is in the office for follow-up for the problems noted below.  He had a carotid scan that we did for dizziness came back normal.  He had event monitor which revealed no cardiac arrhythmias.  He stopped taking all his hypertension therapy for a while and his symptoms completely went away.  He became significantly hypotensive and went back on his medications except for the amlodipine since he ran out of it.  He is hypertensive in the office today but feels well.  Since he went back on his medication he went again has started having his frequent events of dizziness.  This is clearly a side effect of all of his medications.  His cardiac and pulmonary examinations were normal.  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  He continues to have dizziness that seems to be related to one of his medications.  I decided to rearrange his medications, we will switch to nebivolol and indapamide, reduce clonidine and switch from ACE inhibitor to ARB.  I will reduce the dose of hydralazine as well.  I pointed out to the patient that we may never find a combination of therapy that would be completely effective and not  "associated with side effects.  3-prediabetes, his PCP is monitoring his blood sugar. Patient lost significant weight which helped this matter significantly.  4-intermittent palpitations and dizziness with headache and with reported TIAs, investigations revealed no carotid disease and normal event monitor.  This is likely caused by his hypertension therapy.  5-previous history of drug-induced gynecomastia which resolved by discontinuation of Aldactone, doing well on eplerenone     Review of Systems   Cardiovascular:  Positive for dyspnea on exertion.   Neurological:  Positive for dizziness.            Vitals:    07/15/24 1322 07/15/24 1343   BP: 146/86 (!) 170/96   BP Location: Left arm Left arm   Patient Position: Sitting Sitting   Pulse: 72    Weight: 74.5 kg (164 lb 3.2 oz)    Height: 1.676 m (5' 6\")         Objective   Physical Exam  Constitutional:       Appearance: Normal appearance.   HENT:      Nose: Nose normal.   Neck:      Vascular: No carotid bruit.   Cardiovascular:      Rate and Rhythm: Normal rate.      Pulses: Normal pulses.      Heart sounds: Normal heart sounds.   Pulmonary:      Effort: Pulmonary effort is normal.   Abdominal:      General: Bowel sounds are normal.      Palpations: Abdomen is soft.   Musculoskeletal:         General: Normal range of motion.      Cervical back: Normal range of motion.      Right lower leg: No edema.      Left lower leg: No edema.   Skin:     General: Skin is warm and dry.   Neurological:      General: No focal deficit present.      Mental Status: He is alert.   Psychiatric:         Mood and Affect: Mood normal.         Behavior: Behavior normal.         Thought Content: Thought content normal.         Judgment: Judgment normal.         Allergies  Hydrocodone-acetaminophen and Spironolactone     Current Medications    Current Outpatient Medications:   •  aspirin 81 mg EC tablet, Take 1 tablet (81 mg) by mouth once daily., Disp: 90 tablet, Rfl: 3  •  cyclobenzaprine " (Flexeril) 10 mg tablet, Take 1 tablet (10 mg) by mouth 2 times a day as needed for muscle spasms., Disp: , Rfl:   •  eplerenone (Inspra) 25 mg tablet, Take 1 tablet (25 mg) by mouth once daily., Disp: , Rfl:   •  potassium chloride CR 20 mEq ER tablet, Take 2 tablets (40 mEq) by mouth once daily., Disp: , Rfl:   •  zolpidem (Ambien) 5 mg tablet, Take 1 tablet (5 mg) by mouth once daily at bedtime., Disp: , Rfl:   •  cloNIDine (Catapres) 0.1 mg tablet, Take 1 tablet (0.1 mg) by mouth once daily., Disp: 90 tablet, Rfl: 3  •  hydrALAZINE (Apresoline) 50 mg tablet, Take 1 tablet (50 mg) by mouth 2 times a day., Disp: 180 tablet, Rfl: 3  •  indapamide (Lozol) 2.5 mg tablet, Take 1 tablet (2.5 mg) by mouth once daily in the morning., Disp: 90 tablet, Rfl: 3  •  nebivolol (Bystolic) 10 mg tablet, Take 1 tablet (10 mg) by mouth once daily., Disp: 90 tablet, Rfl: 3  •  NIFEdipine (Procardia) 10 mg capsule, Take 1 capsule (10 mg) by mouth 3 times a day., Disp: 270 capsule, Rfl: 3  •  valsartan (Diovan) 160 mg tablet, Take 1 tablet (160 mg) by mouth once daily., Disp: 90 tablet, Rfl: 3                     Assessment/Plan   1. Dizziness        2. Resistant hypertension  Follow Up In Cardiology    NIFEdipine (Procardia) 10 mg capsule    nebivolol (Bystolic) 10 mg tablet    indapamide (Lozol) 2.5 mg tablet    valsartan (Diovan) 160 mg tablet    Follow Up In Cardiology    cloNIDine (Catapres) 0.1 mg tablet    hydrALAZINE (Apresoline) 50 mg tablet    Follow Up In Cardiology      3. BMI 26.0-26.9,adult        4. Former smoker        5. History of TIA (transient ischemic attack)                 Scribe Attestation  By signing my name below, Agatha JERONIMO LPN  , Scribe   attest that this documentation has been prepared under the direction and in the presence of Gogo Chapman MD.     Provider Attestation - Scribe documentation    All medical record entries made by the Scribe were at my direction and personally dictated by me. I have  reviewed the chart and agree that the record accurately reflects my personal performance of the history, physical exam, discussion and plan.

## 2024-08-27 ENCOUNTER — APPOINTMENT (OUTPATIENT)
Dept: CARDIOLOGY | Facility: CLINIC | Age: 54
End: 2024-08-27
Payer: COMMERCIAL

## 2024-08-27 ENCOUNTER — TELEPHONE (OUTPATIENT)
Dept: CARDIOLOGY | Facility: CLINIC | Age: 54
End: 2024-08-27

## 2024-08-27 VITALS
WEIGHT: 165 LBS | BODY MASS INDEX: 26.52 KG/M2 | HEIGHT: 66 IN | DIASTOLIC BLOOD PRESSURE: 100 MMHG | HEART RATE: 68 BPM | SYSTOLIC BLOOD PRESSURE: 170 MMHG

## 2024-08-27 DIAGNOSIS — Z87.891 FORMER SMOKER: ICD-10-CM

## 2024-08-27 DIAGNOSIS — I1A.0 RESISTANT HYPERTENSION: ICD-10-CM

## 2024-08-27 DIAGNOSIS — R06.02 SHORTNESS OF BREATH: ICD-10-CM

## 2024-08-27 PROCEDURE — 3077F SYST BP >= 140 MM HG: CPT | Performed by: INTERNAL MEDICINE

## 2024-08-27 PROCEDURE — 3080F DIAST BP >= 90 MM HG: CPT | Performed by: INTERNAL MEDICINE

## 2024-08-27 PROCEDURE — 3008F BODY MASS INDEX DOCD: CPT | Performed by: INTERNAL MEDICINE

## 2024-08-27 PROCEDURE — 99213 OFFICE O/P EST LOW 20 MIN: CPT | Performed by: INTERNAL MEDICINE

## 2024-08-27 RX ORDER — VALSARTAN 160 MG/1
160 TABLET ORAL 2 TIMES DAILY
Qty: 60 TABLET | Refills: 11 | Status: SHIPPED | OUTPATIENT
Start: 2024-08-27 | End: 2025-08-27

## 2024-08-27 RX ORDER — NIFEDIPINE 90 MG/1
90 TABLET, EXTENDED RELEASE ORAL DAILY
Qty: 30 TABLET | Refills: 11 | Status: SHIPPED | OUTPATIENT
Start: 2024-08-27 | End: 2025-08-27

## 2024-08-27 NOTE — PROGRESS NOTES
"Subjective   Luis Armando Baez is a 54 y.o. male       Chief Complaint    Hypertension          HPI   Patient was in the office today for hypertension management.  This case has become extremely difficult to manage.  Despite very aggressive medical therapy he remains severely hypertensive and continue to complain of dizziness and not being able to function very well.  He has no vascular disease and no kidney disease.  I cannot find a reasonable lead toward secondary hypertension.  I am not questioning the patient's compliance with medical therapy.  Despite taking his medications this morning his pressure was 170/100 mmHg.  He has no sleep apnea and no alcohol abuse.  I suggested that we obtain 24-hour ambulatory blood pressure monitoring, unfortunately we found out after he left the office that this is not available locally or at  system.  I would recommend that the dose of valsartan increase is up to 160 mg twice daily and nifedipine increased up to 90 mg daily.  ROS         Vitals:    08/27/24 0927 08/27/24 0929 08/27/24 0938 08/27/24 1005   BP: (!) 164/100 (!) 170/102 (!) 174/110 (!) 170/100   BP Location: Left arm Left arm Right leg Left arm   Patient Position: Sitting Sitting Sitting Sitting   Pulse: 68      Weight: 74.8 kg (165 lb)      Height: 1.676 m (5' 6\")           Objective   Physical Exam    Allergies  Hydrocodone-acetaminophen and Spironolactone     Current Medications    Current Outpatient Medications:     aspirin 81 mg EC tablet, Take 1 tablet (81 mg) by mouth once daily., Disp: 90 tablet, Rfl: 3    cloNIDine (Catapres) 0.1 mg tablet, Take 1 tablet (0.1 mg) by mouth once daily., Disp: 90 tablet, Rfl: 3    cyclobenzaprine (Flexeril) 10 mg tablet, Take 1 tablet (10 mg) by mouth 2 times a day as needed for muscle spasms., Disp: , Rfl:     eplerenone (Inspra) 25 mg tablet, Take 1 tablet (25 mg) by mouth once daily., Disp: , Rfl:     hydrALAZINE (Apresoline) 50 mg tablet, Take 1 tablet (50 mg) by mouth 2 " times a day., Disp: 180 tablet, Rfl: 3    indapamide (Lozol) 2.5 mg tablet, Take 1 tablet (2.5 mg) by mouth once daily in the morning., Disp: 90 tablet, Rfl: 3    nebivolol (Bystolic) 10 mg tablet, Take 1 tablet (10 mg) by mouth once daily., Disp: 90 tablet, Rfl: 3    NIFEdipine (Procardia) 10 mg capsule, Take 1 capsule (10 mg) by mouth 3 times a day., Disp: 270 capsule, Rfl: 3    potassium chloride CR 20 mEq ER tablet, Take 2 tablets (40 mEq) by mouth once daily., Disp: , Rfl:     valsartan (Diovan) 160 mg tablet, Take 1 tablet (160 mg) by mouth once daily., Disp: 90 tablet, Rfl: 3    zolpidem (Ambien) 5 mg tablet, Take 1 tablet (5 mg) by mouth once daily at bedtime., Disp: , Rfl:                      Assessment/Plan   1. Resistant hypertension  Follow Up In Cardiology    Follow Up In Cardiology    24 hour blood pressure monitor      2. Former smoker        3. BMI 26.0-26.9,adult                 Scribe Attestation  By signing my name below, I, Shiela WILBURN RN   , Scribe   attest that this documentation has been prepared under the direction and in the presence of Gogo Chapman MD.     Provider Attestation - Scribe documentation    All medical record entries made by the Scribe were at my direction and personally dictated by me. I have reviewed the chart and agree that the record accurately reflects my personal performance of the history, physical exam, discussion and plan.

## 2024-08-27 NOTE — TELEPHONE ENCOUNTER
24 hour b/p monitor was ordered at visit today. [facility] no longer has this available. Call placed to [facility],. Facility no longer  offers this service.     Will attempt [facility] in [place] to inquire   Have her stop the crestor. I have sent in the simvastatin.

## 2024-08-27 NOTE — TELEPHONE ENCOUNTER
Dr. Gogo Chapman MD gave written order for Procardia 90 one daily and Valsartan 160 mg two times daily. Patient is aware that b/p monitor for 24 hrs is not available at any local facilities.     Will place order for chem 6 in 2 weeks in chart.     Patient states he is concerned about his heart. He has been having intermittent dyspnea with activity.  Patient also experiences tunnel vision 5-6 times during the day. Patient can experience this with activity like going up stairs. Last stress 2019, and inquired about more testing.     Current home b/p is 136/89 with home monitor  .  Monitor does have a way to track his b/p readings. He will monitor b/p at home and bring monitor to office with next check. He will update office with any changes.     To Dr. Gogo Chapman,

## 2024-08-28 NOTE — TELEPHONE ENCOUNTER
RX sent . Lab order mailed.     Patient inquire if he could have stress testing. Concerned he had a stroke to his eye and with his elevated b/p and dyspnea wants to have his heart checked also.

## 2024-08-28 NOTE — TELEPHONE ENCOUNTER
Patient contacted. Will placed order for stress test.     Patient states  in [place] reached out to him and scheduled the 24 hour ambulatory b/p monitor for 9/9. Medication changes were made after office was informed that [facility] [facility] [place] [facility] does not have this testing available. Patient will keep monitor scheduled 9/9 and will proceed with stress testing.     Patient states today his b/p was 168/105 at 9 30 am ( takes morning meds at 5 30 am, and one hour later his b/p was 118/75 and later 123/82.    Will inquire if patient should increase medications still or remain the same?  To Dr. Gogo Chapman MD

## 2024-08-30 DIAGNOSIS — I1A.0 RESISTANT HYPERTENSION: ICD-10-CM

## 2024-08-30 RX ORDER — EPLERENONE 25 MG/1
25 TABLET, FILM COATED ORAL DAILY
Qty: 90 TABLET | Refills: 3 | Status: SHIPPED | OUTPATIENT
Start: 2024-08-30 | End: 2025-08-30

## 2024-08-30 NOTE — TELEPHONE ENCOUNTER
Written order per Dr. Gogo Chapman MD to increase rx's now.     Detailed message left on a his a/m to update.

## 2024-09-05 ENCOUNTER — TELEPHONE (OUTPATIENT)
Dept: CARDIOLOGY | Facility: CLINIC | Age: 54
End: 2024-09-05
Payer: COMMERCIAL

## 2024-09-05 DIAGNOSIS — R07.9 CHEST PAIN, UNSPECIFIED TYPE: ICD-10-CM

## 2024-09-05 DIAGNOSIS — R06.02 SHORTNESS OF BREATH: ICD-10-CM

## 2024-09-05 NOTE — TELEPHONE ENCOUNTER
Per [facility] insurance. Nuclear stress test is denied. To Dr Chapman for review.

## 2024-09-09 ENCOUNTER — HOSPITAL ENCOUNTER (OUTPATIENT)
Dept: CARDIOLOGY | Facility: HOSPITAL | Age: 54
Discharge: HOME | End: 2024-09-09
Payer: COMMERCIAL

## 2024-09-09 DIAGNOSIS — I1A.0 RESISTANT HYPERTENSION: ICD-10-CM

## 2024-09-09 PROCEDURE — 93790 AMBL BP MNTR W/SW I&R: CPT | Performed by: INTERNAL MEDICINE

## 2024-09-09 PROCEDURE — 9420000001 HC RT PATIENT EDUCATION 5 MIN

## 2024-09-09 PROCEDURE — 93786 AMBL BP MNTR W/SW REC ONLY: CPT

## 2024-09-11 ENCOUNTER — TELEPHONE (OUTPATIENT)
Dept: CARDIOLOGY | Facility: CLINIC | Age: 54
End: 2024-09-11
Payer: COMMERCIAL

## 2024-09-11 LAB
NON-UH HIE ANION GAP: 13.6 (ref 6–15)
NON-UH HIE BLOOD UREA NITROGEN: 16 MG/DL (ref 7–25)
NON-UH HIE CALCIUM: 9.7 MG/DL (ref 8.6–10.3)
NON-UH HIE CARBON DIOXIDE: 29.2 MMOL/L (ref 21–31)
NON-UH HIE CHLORIDE: 99 MMOL/L (ref 98–107)
NON-UH HIE CREATININE: 0.88 MG/DL (ref 0.7–1.3)
NON-UH HIE ESTIMATED GFR: > 60
NON-UH HIE GLUCOSE: 174 MG/DL (ref 70–100)
NON-UH HIE POTASSIUM: 2.8 MMOL/L (ref 3.5–5.1)
NON-UH HIE SODIUM: 139 MMOL/L (ref 136–145)

## 2024-09-11 NOTE — TELEPHONE ENCOUNTER
Dr. Esme Root MD in Dr. Gogo Chapman MD absence     [facility] lab called with Critical lab of 2.8 potassium     Please advise     Patient taking potassium chloride 20 mEq daily

## 2024-09-13 ENCOUNTER — TELEPHONE (OUTPATIENT)
Dept: CARDIOLOGY | Facility: CLINIC | Age: 54
End: 2024-09-13
Payer: COMMERCIAL

## 2024-09-13 DIAGNOSIS — E87.6 HYPOKALEMIA: ICD-10-CM

## 2024-09-13 DIAGNOSIS — I1A.0 RESISTANT HYPERTENSION: ICD-10-CM

## 2024-09-13 RX ORDER — EPLERENONE 25 MG/1
50 TABLET, FILM COATED ORAL DAILY
Start: 2024-09-13 | End: 2025-09-13

## 2024-09-13 NOTE — TELEPHONE ENCOUNTER
----- Message from Gogo Chapman sent at 9/12/2024 10:48 PM EDT -----  He has critical K level , double KCL to 80 MEW daily , up Eplerinone to 50 mg/d, Check K level on 9/16  ----- Message -----  From: Interface, [facility] - Lab Results In  Sent: 9/11/2024  10:31 AM EDT  To: Gogo Chapman MD

## 2024-09-13 NOTE — TELEPHONE ENCOUNTER
There is another task in his chart with orders but called and instructed the new meds and lab draw on the 16 th. Patient verbalized understanding

## 2024-09-16 LAB — NON-UH HIE POTASSIUM: 3.2 MMOL/L (ref 3.5–5.1)

## 2024-09-17 ENCOUNTER — TELEPHONE (OUTPATIENT)
Dept: CARDIOLOGY | Facility: CLINIC | Age: 54
End: 2024-09-17
Payer: COMMERCIAL

## 2024-09-17 DIAGNOSIS — I1A.0 RESISTANT HYPERTENSION: ICD-10-CM

## 2024-09-17 NOTE — TELEPHONE ENCOUNTER
Phoned patient to discuss results and with new orders. Verbalized understanding. Requesting to be taken off of whatever medication that is depleting his potassium. Please advise.     To Dr. Gogo Chapman MD      Lab slip to be faxed to [facility].

## 2024-09-17 NOTE — TELEPHONE ENCOUNTER
----- Message from Gogo Chapman sent at 9/16/2024  7:15 PM EDT -----  Let him know I am satisfied with his blood pressure readings based on his monitor  ----- Message -----  From: Gogo Chapman MD  Sent: 9/15/2024   6:14 PM EDT  To: Gogo Chapman MD

## 2024-09-17 NOTE — TELEPHONE ENCOUNTER
----- Message from Gogo Chapman sent at 9/16/2024  7:14 PM EDT -----  Increase potassium up to 100 mEq daily and repeat potassium level in 1 week, have him take a banana daily  ----- Message -----  From: Nani Hardni - Lab Results In  Sent: 9/16/2024   9:47 AM EDT  To: Gogo Chapman MD

## 2024-09-17 NOTE — TELEPHONE ENCOUNTER
Result Communication    Resulted Orders   24 hour blood pressure monitor    Narrative    The patient blood pressure was in the normal range in nearly 75% of the   time.  The remainder of his blood pressure readings were above normal   reaching up to 165/106 mmHg and one reading.  Patient's heart rate was   normal throughout.  The elevated blood pressure readings are scattered   throughout the day and night

## 2024-09-18 ENCOUNTER — APPOINTMENT (OUTPATIENT)
Dept: RADIOLOGY | Facility: CLINIC | Age: 54
End: 2024-09-18
Payer: COMMERCIAL

## 2024-09-18 ENCOUNTER — APPOINTMENT (OUTPATIENT)
Dept: CARDIOLOGY | Facility: CLINIC | Age: 54
End: 2024-09-18
Payer: COMMERCIAL

## 2024-09-19 RX ORDER — POTASSIUM CHLORIDE 20 MEQ/1
100 TABLET, EXTENDED RELEASE ORAL DAILY
Qty: 450 TABLET | Refills: 3 | Status: SHIPPED | OUTPATIENT
Start: 2024-09-19 | End: 2025-09-19

## 2024-09-23 ENCOUNTER — APPOINTMENT (OUTPATIENT)
Dept: CARDIOLOGY | Facility: CLINIC | Age: 54
End: 2024-09-23
Payer: COMMERCIAL

## 2024-09-23 VITALS
BODY MASS INDEX: 27.64 KG/M2 | HEIGHT: 66 IN | SYSTOLIC BLOOD PRESSURE: 146 MMHG | WEIGHT: 172 LBS | HEART RATE: 60 BPM | DIASTOLIC BLOOD PRESSURE: 94 MMHG

## 2024-09-23 DIAGNOSIS — Z87.891 FORMER SMOKER: ICD-10-CM

## 2024-09-23 DIAGNOSIS — I1A.0 RESISTANT HYPERTENSION: ICD-10-CM

## 2024-09-23 PROCEDURE — 99212 OFFICE O/P EST SF 10 MIN: CPT | Performed by: INTERNAL MEDICINE

## 2024-09-23 PROCEDURE — 3008F BODY MASS INDEX DOCD: CPT | Performed by: INTERNAL MEDICINE

## 2024-09-23 PROCEDURE — 3080F DIAST BP >= 90 MM HG: CPT | Performed by: INTERNAL MEDICINE

## 2024-09-23 PROCEDURE — 3077F SYST BP >= 140 MM HG: CPT | Performed by: INTERNAL MEDICINE

## 2024-09-23 NOTE — PATIENT INSTRUCTIONS
Please bring all medicines, vitamins, and herbal supplements with you when you come to the office.    Prescriptions will not be filled unless you are compliant with your follow up appointments or have a follow up appointment scheduled as per instruction of your physician. Refills should be requested at the time of your visit.     BMI was above normal measurement. Current weight: 78 kg (172 lb)  Weight change since last visit (-) denotes wt loss 7 lbs   Weight loss needed to achieve BMI 25: 17.4 Lbs  Weight loss needed to achieve BMI 30: -13.5 Lbs  Provided instructions on dietary changes  Provided instructions on exercise.    Get calcium score to screen for any further issues

## 2024-09-23 NOTE — PROGRESS NOTES
"Subjective   Luis Armando Baez is a 54 y.o. male       Chief Complaint    Hypertension          HPI   Patient is in the office for hypertension management.  He had 24-hour blood pressure monitor that demonstrated reasonable blood pressure control with occasional spikes.  He was on medications that covers all classes of hypertension therapy at near maximal dose.  He was having hypokalemic readings on his blood work which have been corrected with supplemental potassium and it seems to me that his diet also is rich in potassium.  He is scheduled to have a coronary CT angiography October 1 at [facility].  His blood pressure today was slightly elevated and we will not make any adjustment of medication at this time.  Will update the patient on the results of his CT coronary angiography  ROS         Vitals:    09/23/24 1328 09/23/24 1336   BP: (!) 144/94 (!) 146/94   BP Location: Left arm Left arm   Patient Position: Sitting Sitting   Pulse: 60    Weight: 78 kg (172 lb)    Height: 1.676 m (5' 6\")         Objective   Physical Exam    Allergies  Hydrocodone-acetaminophen and Spironolactone     Current Medications    Current Outpatient Medications:     aspirin 81 mg EC tablet, Take 1 tablet (81 mg) by mouth once daily., Disp: 90 tablet, Rfl: 3    cloNIDine (Catapres) 0.1 mg tablet, Take 1 tablet (0.1 mg) by mouth once daily., Disp: 90 tablet, Rfl: 3    cyclobenzaprine (Flexeril) 10 mg tablet, Take 1 tablet (10 mg) by mouth 2 times a day as needed for muscle spasms., Disp: , Rfl:     eplerenone (Inspra) 25 mg tablet, Take 2 tablets (50 mg) by mouth once daily., Disp: , Rfl:     hydrALAZINE (Apresoline) 50 mg tablet, Take 1 tablet (50 mg) by mouth 2 times a day., Disp: 180 tablet, Rfl: 3    indapamide (Lozol) 2.5 mg tablet, Take 1 tablet (2.5 mg) by mouth once daily in the morning., Disp: 90 tablet, Rfl: 3    nebivolol (Bystolic) 10 mg tablet, Take 1 tablet (10 mg) by mouth once daily., Disp: 90 tablet, Rfl: 3    " NIFEdipine ER (Procardia XL) 90 mg 24 hr tablet, Take 1 tablet (90 mg) by mouth once daily. Do not crush, chew, or split., Disp: 30 tablet, Rfl: 11    potassium chloride CR 20 mEq ER tablet, Take 5 tablets (100 mEq) by mouth once daily., Disp: 450 tablet, Rfl: 3    valsartan (Diovan) 160 mg tablet, Take 1 tablet (160 mg) by mouth 2 times a day., Disp: 60 tablet, Rfl: 11    zolpidem (Ambien) 5 mg tablet, Take 1 tablet (5 mg) by mouth once daily at bedtime., Disp: , Rfl:     valsartan (Diovan) 160 mg tablet, Take 1 tablet (160 mg) by mouth once daily., Disp: 90 tablet, Rfl: 3                     Assessment/Plan   1. Resistant hypertension  Follow Up In Cardiology      2. Former smoker        3. BMI 27.0-27.9,adult                 Scribe Attestation  By signing my name below, I, Shiela WILBURN RN   , Scribe   attest that this documentation has been prepared under the direction and in the presence of Gogo Chapman MD.     Provider Attestation - Scribe documentation    All medical record entries made by the Scribe were at my direction and personally dictated by me. I have reviewed the chart and agree that the record accurately reflects my personal performance of the history, physical exam, discussion and plan.

## 2024-09-24 LAB
NON-UH HIE ANION GAP: 10.4 (ref 6–15)
NON-UH HIE BLOOD UREA NITROGEN: 18 MG/DL (ref 7–25)
NON-UH HIE CALCIUM: 9.8 MG/DL (ref 8.6–10.3)
NON-UH HIE CARBON DIOXIDE: 29.9 MMOL/L (ref 21–31)
NON-UH HIE CHLORIDE: 95 MMOL/L (ref 98–107)
NON-UH HIE CREATININE: 1.1 MG/DL (ref 0.7–1.3)
NON-UH HIE ESTIMATED GFR: > 60
NON-UH HIE GLUCOSE: 148 MG/DL (ref 70–100)
NON-UH HIE POTASSIUM: 3.3 MMOL/L (ref 3.5–5.1)
NON-UH HIE SODIUM: 132 MMOL/L (ref 136–145)

## 2024-09-25 ENCOUNTER — TELEPHONE (OUTPATIENT)
Dept: CARDIOLOGY | Facility: CLINIC | Age: 54
End: 2024-09-25
Payer: COMMERCIAL

## 2024-09-25 NOTE — TELEPHONE ENCOUNTER
Result Communication    Resulted Orders   [facility] HIE Basic Metabolic Panel   Result Value Ref Range    [facility] HIE Glucose 148 (H) 70 - 100 mg/dL      Comment:         Random Glucose Reference Range is dependent on time and   content of last meal. Glucose of more than 200 mg/dL in a   nonstressed, ambulatory subject supports the diagnosis of   Diabetes Mellitus.   ADA recommended reference range    [facility] HIE Blood Urea Nitrogen 18 7 - 25 mg/dL    [facility] Creatinine 1.10 0.70 - 1.30 mg/dL    [facility] H[facility] ESTIMATED GFR > 60.0     [facility] HIE Sodium 132 (L) 136 - 145 mmol/L    [facility] Potassium 3.3 (L) 3.5 - 5.1 mmol/L    [facility] Chloride 95 (L) 98 - 107 mmol/L    [facility] Carbon Dioxide 29.9 21.0 - 31.0 mmol/L    [facility] H[facility] Anion Gap 10.4 6.0 - 15.0    [facility] HIE Calcium 9.8 8.6 - 10.3 mg/dL      Comment:      PERFORMED BY:[facility] [place], [place] [id number] MEDICAL DIRECTORJASON LUTZ M.D.       1:21 PM      Results were successfully communicated with the patient and they acknowledged their understanding.

## 2024-09-25 NOTE — TELEPHONE ENCOUNTER
----- Message from Gogo Chapman sent at 9/24/2024  8:07 PM EDT -----  Let him know his K level is better , eat at least one Banana daily  ----- Message -----  From: Nani Hardin - Lab Results In  Sent: 9/24/2024  10:23 AM EDT  To: Gogo Chapman MD

## 2024-10-01 ENCOUNTER — TELEPHONE (OUTPATIENT)
Dept: CARDIOLOGY | Facility: CLINIC | Age: 54
End: 2024-10-01
Payer: COMMERCIAL

## 2024-10-01 ENCOUNTER — HOSPITAL ENCOUNTER (OUTPATIENT)
Dept: RADIOLOGY | Facility: CLINIC | Age: 54
Discharge: HOME | End: 2024-10-01
Payer: COMMERCIAL

## 2024-10-01 VITALS
OXYGEN SATURATION: 100 % | SYSTOLIC BLOOD PRESSURE: 134 MMHG | DIASTOLIC BLOOD PRESSURE: 79 MMHG | RESPIRATION RATE: 20 BRPM | HEART RATE: 59 BPM

## 2024-10-01 DIAGNOSIS — I1A.0 RESISTANT HYPERTENSION: ICD-10-CM

## 2024-10-01 DIAGNOSIS — R06.02 SHORTNESS OF BREATH: ICD-10-CM

## 2024-10-01 DIAGNOSIS — G45.9 TIA (TRANSIENT ISCHEMIC ATTACK): ICD-10-CM

## 2024-10-01 DIAGNOSIS — R93.1 ELEVATED CORONARY ARTERY CALCIUM SCORE: ICD-10-CM

## 2024-10-01 DIAGNOSIS — R07.9 CHEST PAIN, UNSPECIFIED TYPE: ICD-10-CM

## 2024-10-01 DIAGNOSIS — R93.1 ABNORMAL FINDINGS ON DIAGNOSTIC IMAGING OF HEART AND CORONARY CIRCULATION: ICD-10-CM

## 2024-10-01 DIAGNOSIS — Z87.891 FORMER SMOKER: ICD-10-CM

## 2024-10-01 DIAGNOSIS — E78.5 HYPERLIPIDEMIA, UNSPECIFIED HYPERLIPIDEMIA TYPE: ICD-10-CM

## 2024-10-01 PROCEDURE — 2550000001 HC RX 255 CONTRASTS: Performed by: INTERNAL MEDICINE

## 2024-10-01 PROCEDURE — 2500000001 HC RX 250 WO HCPCS SELF ADMINISTERED DRUGS (ALT 637 FOR MEDICARE OP): Performed by: INTERNAL MEDICINE

## 2024-10-01 PROCEDURE — 75574 CT ANGIO HRT W/3D IMAGE: CPT

## 2024-10-01 PROCEDURE — 75580 N-INVAS EST C FFR SW ALY CTA: CPT

## 2024-10-01 PROCEDURE — 75580 N-INVAS EST C FFR SW ALY CTA: CPT | Performed by: INTERNAL MEDICINE

## 2024-10-01 PROCEDURE — 75574 CT ANGIO HRT W/3D IMAGE: CPT | Performed by: INTERNAL MEDICINE

## 2024-10-01 RX ORDER — NITROGLYCERIN 400 UG/1
2 SPRAY ORAL ONCE
Status: COMPLETED | OUTPATIENT
Start: 2024-10-01 | End: 2024-10-01

## 2024-10-01 RX ORDER — ROSUVASTATIN CALCIUM 20 MG/1
20 TABLET, COATED ORAL DAILY
Qty: 90 TABLET | Refills: 3 | Status: SHIPPED | OUTPATIENT
Start: 2024-10-01 | End: 2025-10-01

## 2024-10-01 RX ORDER — ATENOLOL 100 MG/1
100 TABLET ORAL ONCE
Status: COMPLETED | OUTPATIENT
Start: 2024-10-01 | End: 2024-10-01

## 2024-10-01 NOTE — TELEPHONE ENCOUNTER
Result Communication    Resulted Orders   CT angio coronary art with heartflow if score >30%    Addendum: 10/1/2024    Interpreted By:  Wiliam Perales,   ADDENDUM:  NON-CARDIOVASCULAR FINDINGS      INCLUDED LUNGS, AIRWAYS AND PLEURA  Endotracheal / endobronchial lesion: Negative  Nodule: None suspect for neoplasm. The obvious left lower lobe nodule  is a densely calcified, definitively benign granuloma Airspace  disease: Negative Pleural effusion: Negative  Pneumothorax: Negative      Other: No acute or contributory unanticipated findings      INCLUDED NON-CARDIOVASCULAR CELINE AND MEDIASTINUM  Adenopathy: Symmetric mild nonspecific bilateral hilar adenopathy  Included esophagus: Unremarkable      Other: No acute or contributory unanticipated findings      INCLUDED BONES: No acute skeletal findings, noting less sensitivity  and specificity without dedicated sagittal and coronal reformatted  series.      INCLUDED CHEST WALL  No acute or contributory unanticipated findings      INCLUDED UPPER ABDOMEN  No acute or contributory unanticipated findings      -------      NON-CARDIOVASCULAR IMPRESSION      NO ACUTE OR CONTRIBUTORY UNEXPECTED FINDINGS OF THE INCLUDED  NON-CARDIOVASCULAR STRUCTURES      NOTE THIS ADDENDUM IS SOLELY FOR INTERPRETATION OF ANATOMY OUTSIDE  THE CARDIOVASCULAR SYSTEM. INTERPRETATION OF AND REPORTING OF THE  CARDIOVASCULAR STRUCTURES ARE THE SOLE RESPONSIBILITY OF THE  CARDIOLOGIST SUBMITTING THE ORIGINAL REPORT (NOT THIS ADDENDUM)      Signed by: Wiliam Perales 10/1/2024 1:12 PM      -------- ORIGINAL REPORT --------  Dictation workstation:   [id number]      Narrative    Interpreted By:  oMhan Coe,   STUDY:  CT ANGIO CORONARY ART WITH HEARTFLOW IF SCORE >30%;  10/1/2024 8:50 am      INDICATION:  Signs/Symptoms:cp & sob.      COMPARISON:  None.      ACCESSION NUMBER(S):  [id number]      ORDERING CLINICIAN:  ANABEL SOLANO      TECHNIQUE:  Using multi-detector CT technology, Heydi 64-slice  scanner, axial,  sequential imaging with retrospective gating was performed of the  chest following the intravenous administration of contrast material.  A low-osmolar contrast agent was used 75 ml of Omnipaque 350. Using  prospective ECG gating, CT scan of the coronary arteries was  performed without intravenous contrast. Coronary calcium scoring was  performed according to the method of Agatston.      In addition, CT-FFR analysis was also performed.       The patient was premedicated with atenolol and 0.4 mg sublingual  nitroglycerin per protocol for heart rate control and coronary  dilation, respectively.      For optimization of anatomic evaluation, multiplanar reconstruction,  maximum intensity projections, and advanced 3-D off-line  postprocessing were performed on a dedicated stand-alone workstation  under the direct supervision of the interpreting physician.      Independent FFR analysis of Heartflow 3D model was performed by  interpreting physician.          CT Dose-Length Product (DLP):  788.5 mGy/cm  CT Dose Reduction Employed: Yes iterative reconstruction      FINDINGS:  The left main is normal sized vessel that  bifurcates into the LAD  and circumflex.      Proximal/mid/distal calcified plaque without significant stenosis.      LEFT ANTERIOR DESCENDING ARTERY:  The LAD is a normal size vessel that  wraps around the apex.  Proximal calcified plaque 40-50% stenosis. Proximal FFR 0.89  (hemodynamically nonsignificant). Mid vessel noncalcified plaque  without significant stenosis. Mid LAD FFR 0.77, 0.73. Distal  noncalcified plaque without significant stenosis. Distal FFR 0.67.  There is very graduated decreasing FFR measurements along the length  of the left anterior descending coronary artery suggestive of  moderate diffuse disease. No severe focal drop off in FFR  measurements to suggest severe focal stenosis. LAD gives rise to 1  acute diagonal branches. D1: Proximal noncalcified plaque with  50%  stenosis. Proximal FFR 0.71 (borderline abnormal).      Ramus: Proximal noncalcified plaque without significant stenosis.      LEFT CIRCUMFLEX ARTERY:  The LCfx is a normal size vessel, which is  non-dominant.  Proximal/mid calcified plaque without significant stenosis.  LCfx gives rise to 2 obtuse marginal branches.  OM1 proximal calcified plaque without significant stenosis.  OM2 proximal calcified plaque without significant stenosis. Mid  vessel calcified plaque with 50% stenosis. Mid FFR 0.70  (hemodynamically significant).          RIGHT CORONARY ARTERY:  The RCA is a normal size vessel, which is  dominant .      It gives rise to a conus branch, yohana branch, and 1 acute marginal  branches.  In its distal segment it bifurcates into the PDA and PV  branch.      Proximal mixed plaque without significant stenosis. Mid vessel  calcified plaque with 50-70% stenosis. Motion artifact present. Mid  FFR 0.64 (hemodynamically significant). Distal calcified plaque with  50% stenosis.      Right PDA proximal calcified plaque with 50% stenosis. Mid vessel  mixed plaque with 50% stenosis. Mid FFR 0.54 (hemodynamically  significant). Right PLV proximal calcified plaque without significant  stenosis.      Coronary artery calcium score 1643, 99th percentile for age, gender,  and race in asymptomatic patients.      CARDIAC CHAMBERS:  The cardiac chambers demonstrate normal atrioventricular and  ventriculoarterial concordance, and systemic and pulmonary venous  return.      LEFT VENTRICLE:  Normal size End diastolic volume 122 ml, 65 ml/m2      LEFT VENTRICLE MASS:  208 gm, 111 gm/m2      RIGHT VENTRICLE:  Normal size End diastolic volume 168 ml, 90 ml/m2      LEFT ATRIUM:  Normal size End systolic volume 99 ml, 53 ml/m2      RIGHT ATRIUM:  Normal size End systolic volume 112 ml, 60 ml/m2      INTERATRIAL SEPTUM:  Intact.      AORTIC VALVE:  The aortic valve is  trileaflet in morphology. No calcifications.      MITRAL  VALVE:  No thickening/calcification.      THORACIC AORTA:  The visualized thoracic aorta is normal in course, caliber, and  contour.      There is no acute aortic pathology, such as dissection, intramural  hematoma, or contained rupture.      The aortic arch is not included on this examination.      PERICARDIUM:  There is no pericardial effusion of thickening.          FRACTIONAL FLOW RESERVE      LEFT ANTERIOR DESCENDING: Proximal 0.89, mid 0.77, 0.73, distal 0.67  D1: Proximal 0.71      LEFT CIRCUMFLEX: Proximal 0.88, mid 0.82  OM1: Proximal 0.91  OM2: Proximal 0.79, mid 0.70      RIGHT CORONARY ARTERY: Proximal 0.95, mid 0.64, distal 0.61  PDA: Proximal 0.58, mid 0.54  PLV: Proximal 0.58            Impression    1.  Significant stenosis of the mid dominant right coronary artery  with calcified plaque. Mid FFR 0.64 (hemodynamically significant).  2.  Proximal left anterior descending coronary artery calcified  plaque 40-50% stenosis. Proximal FFR 0.89 (hemodynamically  nonsignificant). Mid LAD vessel noncalcified plaque without  significant stenosis. Mid LAD FFR 0.77, 0.73. Distal noncalcified  plaque without significant stenosis. Distal FFR 0.67. There is very  graduated decreasing FFR measurements along the length of the left  anterior descending coronary artery suggestive of moderate diffuse  disease. No severe focal drop off in FFR measurements to suggest  severe focal stenosis.  3. Proximal D1 noncalcified plaque with 50% stenosis. Proximal FFR  0.71 (borderline abnormal).  4. Mid OM2 calcified plaque with 50% stenosis. Mid FFR 0.70  (borderline abnormal).  5. Mild-moderate diffuse coronary artery disease of the remaining  coronary arteries without significant stenosis.  6. Coronary artery calcium score 1643, 99th percentile for age,  gender, and race in asymptomatic patients.  7. Technically difficult study due to heavily calcified coronary  arteries and motion artifact.      Reading Cardiologist:    Mohan Coe, Date:  10/1/2024  11:19 am      Signed by: Mohan Coe 10/1/2024 11:27 AM  Dictation workstation:   [id number]

## 2024-10-01 NOTE — TELEPHONE ENCOUNTER
Informed patient of results and recommendations for heart catherization and to start rosuvastatin 20 mg daily. Patient verbalized understanding.     Orders prepped and sent to Dr. Gogo Chapman MD for signature.     Patient denies any allergy to iodine, contrast, or shellfish. Patient not currently on any diabetic medications or blood thinners. Instructed patient to continue taking aspirin. He verbalized understanding.

## 2024-10-01 NOTE — NURSING NOTE
Post CCTA pt denies feeling dizzy or lightheaded, denies headache. Steady on feet, skin warm pink and dry. Pt verbalized understanding of increased water intake x24 hours, educated on side effects of medications. HL removed with tip intact, manual pressure held until hemostasis achieved, 2x2 and coban to site. Pt DC home to self care

## 2024-10-01 NOTE — TELEPHONE ENCOUNTER
----- Message from Gogo Chapman sent at 10/1/2024 12:58 PM EDT -----  Let him know that he has high coronary calcium score and diffuse disease in his coronary arteries some is showing some significance.  He did to start rosuvastatin 20 mg daily, continue aspirin, schedule elective cardiac catheterization after I return  ----- Message -----  From: Interface, Radiology Results In  Sent: 10/1/2024  11:45 AM EDT  To: Gogo Chapman MD

## 2024-10-02 ENCOUNTER — TELEPHONE (OUTPATIENT)
Dept: CARDIOLOGY | Facility: CLINIC | Age: 54
End: 2024-10-02
Payer: COMMERCIAL

## 2024-10-02 NOTE — TELEPHONE ENCOUNTER
Cardiac cath order not completed with all pertinent diagnosis. Order re-entered with updated diagnosis. Please re-submit.

## 2024-10-02 NOTE — TELEPHONE ENCOUNTER
Heart Cath [id number] DX: R93.1 needs to go to [facility] and clinical documentation needs to be submitted by fax as we cannot upload to the portal-Fax number is [phone]. She said that [facility] needs to be completed by end of day tomorrow by calling [phone] to Ivelisse bunn [facility]-call ref#[id number]. Order number is [id number].

## 2024-10-03 ENCOUNTER — TELEPHONE (OUTPATIENT)
Dept: CARDIOLOGY | Facility: CLINIC | Age: 54
End: 2024-10-03
Payer: COMMERCIAL

## 2024-10-03 NOTE — TELEPHONE ENCOUNTER
Heart Cath [id number] DX: R93.1 is approved at [facility] per the [facility] and is valid from 10/2/24-1/2/25 Approval/Certificate#[id number].

## 2024-10-08 ENCOUNTER — APPOINTMENT (OUTPATIENT)
Dept: CARDIOLOGY | Facility: CLINIC | Age: 54
End: 2024-10-08
Payer: COMMERCIAL

## 2024-10-16 LAB
NON-UH HIE ANION GAP: 11.5 (ref 6–15)
NON-UH HIE BASOPHILS # (AUTO): 0.1 10*3/UL (ref 0–0.2)
NON-UH HIE BASOPHILS % (AUTO): 0.8 %
NON-UH HIE BLOOD UREA NITROGEN: 17 MG/DL (ref 7–25)
NON-UH HIE CARBON DIOXIDE: 31.9 MMOL/L (ref 21–31)
NON-UH HIE CHLORIDE: 100 MMOL/L (ref 98–107)
NON-UH HIE CHOL/HDL RATIO: 3.8
NON-UH HIE CHOLESTEROL: 120 MG/DL (ref 140–200)
NON-UH HIE CREATININE: 0.91 MG/DL (ref 0.7–1.3)
NON-UH HIE EOSINOPHILS # (AUTO): 0.2 10*3/UL (ref 0–0.45)
NON-UH HIE EOSINOPHILS % (AUTO): 3.7 %
NON-UH HIE ESTIMATED GFR: > 60
NON-UH HIE HDL CHOLESTEROL: 32 MG/DL (ref 23–92)
NON-UH HIE HEMATOCRIT: 41.7 % (ref 38.8–50)
NON-UH HIE HEMOGLOBIN: 15 G/DL (ref 13–17)
NON-UH HIE INR: 1.1
NON-UH HIE LDL CHOLESTEROL,CALCULATED: 45 MG/DL (ref 0–100)
NON-UH HIE LYMPHOCYTES # (AUTO): 1.1 10*3/UL (ref 1–4.8)
NON-UH HIE LYMPHOCYTES % (AUTO): 16.3 %
NON-UH HIE MEAN CORPUSCULAR HEMOGLOBIN: 33.4 PG (ref 27.5–35.2)
NON-UH HIE MEAN CORPUSCULAR HGB CONC: 35.9 G/DL (ref 32.5–35.6)
NON-UH HIE MEAN CORPUSCULAR VOLUME: 93 FL (ref 83.5–101)
NON-UH HIE MEAN PLATELET VOLUME: 7.5 FL (ref 6.6–10.1)
NON-UH HIE MONOCYTES # (AUTO): 0.5 10*3/UL (ref 0–0.8)
NON-UH HIE MONOCYTES % (AUTO): 7.3 %
NON-UH HIE NEUTROPHILS # (AUTO): 4.9 10*3/UL (ref 1.8–7.7)
NON-UH HIE NEUTROPHILS % (AUTO): 71.9 %
NON-UH HIE NRBC%: 0.1 /100{WBC} (ref 0–0.5)
NON-UH HIE PARTIAL THROMBOPLASTIN TIME: 35.4 S (ref 25.1–36.5)
NON-UH HIE PLATELET COUNT: 199 10*3/UL (ref 150–450)
NON-UH HIE PLATELET ESTIMATE: NORMAL
NON-UH HIE PLATELET MORPHOLOGY: NORMAL
NON-UH HIE POTASSIUM: 3.4 MMOL/L (ref 3.5–5.1)
NON-UH HIE PROTHROMBIN TIME: 13 S (ref 9–12.9)
NON-UH HIE RBC MORPHOLOGY: NORMAL
NON-UH HIE RED BLOOD COUNT: 4.49 (ref 3.9–5.6)
NON-UH HIE RED CELL DISTRIBUTION WIDTH: 13.7 % (ref 12–14.8)
NON-UH HIE SODIUM: 140 MMOL/L (ref 136–145)
NON-UH HIE TRIGLYCERIDE W/REFLEX: 215 MG/DL (ref 0–149)
NON-UH HIE UNCORRECTED WBC: 6.8 10*3/UL (ref 4.1–10.5)
NON-UH HIE VLDL CHOLESTEROL: 43 MG/DL
NON-UH HIE WHITE BLOOD COUNT: 6.8 10*3/UL (ref 4.1–10.5)

## 2024-10-24 ENCOUNTER — APPOINTMENT (OUTPATIENT)
Dept: RADIOLOGY | Facility: CLINIC | Age: 54
End: 2024-10-24
Payer: COMMERCIAL

## 2025-02-05 ENCOUNTER — APPOINTMENT (OUTPATIENT)
Dept: CARDIOLOGY | Facility: CLINIC | Age: 55
End: 2025-02-05
Payer: COMMERCIAL

## 2025-02-10 ENCOUNTER — APPOINTMENT (OUTPATIENT)
Dept: CARDIOLOGY | Facility: CLINIC | Age: 55
End: 2025-02-10
Payer: COMMERCIAL

## 2025-02-10 VITALS
WEIGHT: 177 LBS | DIASTOLIC BLOOD PRESSURE: 106 MMHG | HEIGHT: 66 IN | SYSTOLIC BLOOD PRESSURE: 160 MMHG | HEART RATE: 82 BPM | BODY MASS INDEX: 28.45 KG/M2

## 2025-02-10 DIAGNOSIS — I25.10 CORONARY ARTERY DISEASE INVOLVING NATIVE CORONARY ARTERY OF NATIVE HEART WITHOUT ANGINA PECTORIS: ICD-10-CM

## 2025-02-10 DIAGNOSIS — I1A.0 RESISTANT HYPERTENSION: Primary | ICD-10-CM

## 2025-02-10 DIAGNOSIS — E78.5 DYSLIPIDEMIA: ICD-10-CM

## 2025-02-10 DIAGNOSIS — Z87.891 FORMER SMOKER: ICD-10-CM

## 2025-02-10 PROCEDURE — 1036F TOBACCO NON-USER: CPT | Performed by: INTERNAL MEDICINE

## 2025-02-10 PROCEDURE — 99214 OFFICE O/P EST MOD 30 MIN: CPT | Performed by: INTERNAL MEDICINE

## 2025-02-10 PROCEDURE — 3080F DIAST BP >= 90 MM HG: CPT | Performed by: INTERNAL MEDICINE

## 2025-02-10 PROCEDURE — 3008F BODY MASS INDEX DOCD: CPT | Performed by: INTERNAL MEDICINE

## 2025-02-10 PROCEDURE — 3077F SYST BP >= 140 MM HG: CPT | Performed by: INTERNAL MEDICINE

## 2025-02-10 RX ORDER — VALSARTAN 160 MG/1
160 TABLET ORAL 2 TIMES DAILY
Qty: 180 TABLET | Refills: 3 | Status: SHIPPED | OUTPATIENT
Start: 2025-02-10 | End: 2026-02-10

## 2025-02-10 RX ORDER — POTASSIUM CHLORIDE 20 MEQ/1
60 TABLET, EXTENDED RELEASE ORAL DAILY
Qty: 270 TABLET | Refills: 3 | Status: SHIPPED | OUTPATIENT
Start: 2025-02-10 | End: 2026-02-10

## 2025-02-10 RX ORDER — LANOLIN ALCOHOL/MO/W.PET/CERES
3000 CREAM (GRAM) TOPICAL DAILY
COMMUNITY

## 2025-02-10 NOTE — LETTER
February 10, 2025     Maria Antonia Hemphill, APRN-CNP  [address] [place] [address]    Patient: Luis Armando Baez   YOB: 1970   Date of Visit: 2/10/2025       Dear Dr. Maria Antonia Hemphill, APRN-CNP:    Thank you for referring Luis Armando Baez to me for evaluation. Below are my notes for this consultation.  If you have questions, please do not hesitate to call me. I look forward to following your patient along with you.       Sincerely,     Gogo Chapman MD      CC: No Recipients  ______________________________________________________________________________________    Subjective   Luis Armando Baez is a 54 y.o. male       Chief Complaint    Follow-up          HPI   Patient is in the office for follow-up for the problems noted below.  Since his last visit he underwent diagnostic cardiac catheterization October 2024 by myself which revealed mild to moderate disease with no interventions needed.  He remains slightly hypertensive and had to stop taking the clonidine and nifedipine because of side effects.  His cardiac pulm examinations were normal he does not have any symptoms.  He wishes to cut back on the potassium supplement since he takes 5 capsules daily.  He is trying to eat more vegetables and try to increase his oral intake of potassium.    Assessment/recommendations:     1-resistant hypertension on multiple medications currently out of control.  He demonstrated intolerance to a number of medications as noted.  He stop the nifedipine and the clonidine.  Will increase the valsartan up to 160 mg twice daily.  Will continue the rest of his medications and cut back on his potassium at his request and follow his labs  3-mild-moderate coronary artery disease by cardiac catheterization October 2024 with no interventions needed.  Aggressive modification of risk factor was discussed and applied.  He is currently on aspirin and high intensity statin  3-previous history of drug-induced  "gynecomastia which resolved by discontinuation of Aldactone, doing well on eplerenone  4-overweight, encouraged patient to maintain active lifestyle and cut back and calorie consumption  5-dyslipidemia on high intensity statin  Review of Systems   All other systems reviewed and are negative.           Vitals:    02/10/25 1611   BP: (!) 160/106   BP Location: Left arm   Patient Position: Sitting   Pulse: 82   Weight: 80.3 kg (177 lb)   Height: 1.676 m (5' 6\")        Objective   Physical Exam  Constitutional:       Appearance: Normal appearance.   HENT:      Nose: Nose normal.   Neck:      Vascular: No carotid bruit.   Cardiovascular:      Rate and Rhythm: Normal rate.      Pulses: Normal pulses.      Heart sounds: Normal heart sounds.   Pulmonary:      Effort: Pulmonary effort is normal.   Abdominal:      General: Bowel sounds are normal.      Palpations: Abdomen is soft.   Musculoskeletal:         General: Normal range of motion.      Cervical back: Normal range of motion.      Right lower leg: No edema.      Left lower leg: No edema.   Skin:     General: Skin is warm and dry.   Neurological:      General: No focal deficit present.      Mental Status: He is alert.   Psychiatric:         Mood and Affect: Mood normal.         Behavior: Behavior normal.         Thought Content: Thought content normal.         Judgment: Judgment normal.         Allergies  Hydrocodone-acetaminophen and Spironolactone     Current Medications    Current Outpatient Medications:   •  cyanocobalamin (Vitamin B-12) 1,000 mcg tablet, Take 3 tablets (3,000 mcg) by mouth once daily., Disp: , Rfl:   •  cyclobenzaprine (Flexeril) 10 mg tablet, Take 1 tablet (10 mg) by mouth 2 times a day as needed for muscle spasms., Disp: , Rfl:   •  eplerenone (Inspra) 25 mg tablet, Take 2 tablets (50 mg) by mouth once daily., Disp: , Rfl:   •  hydrALAZINE (Apresoline) 50 mg tablet, Take 1 tablet (50 mg) by mouth 2 times a day., Disp: 180 tablet, Rfl: 3  •  " indapamide (Lozol) 2.5 mg tablet, Take 1 tablet (2.5 mg) by mouth once daily in the morning., Disp: 90 tablet, Rfl: 3  •  magnesium glycinate 100 mg tablet, Take 200 mg by mouth once daily., Disp: , Rfl:   •  nebivolol (Bystolic) 10 mg tablet, Take 1 tablet (10 mg) by mouth once daily., Disp: 90 tablet, Rfl: 3  •  potassium chloride CR 20 mEq ER tablet, Take 5 tablets (100 mEq) by mouth once daily., Disp: 450 tablet, Rfl: 3  •  rosuvastatin (Crestor) 20 mg tablet, Take 1 tablet (20 mg) by mouth once daily., Disp: 90 tablet, Rfl: 3  •  zolpidem (Ambien) 5 mg tablet, Take 1 tablet (5 mg) by mouth once daily at bedtime., Disp: , Rfl:   •  potassium chloride CR (Klor-Con M20) 20 mEq ER tablet, Take 3 tablets (60 mEq) by mouth once daily. Do not crush or chew., Disp: 270 tablet, Rfl: 3  •  valsartan (Diovan) 160 mg tablet, Take 1 tablet (160 mg) by mouth 2 times a day., Disp: 180 tablet, Rfl: 3                     Assessment/Plan   1. Resistant hypertension  Follow Up In Cardiology    valsartan (Diovan) 160 mg tablet    Basic Metabolic Panel    Follow Up In Cardiology    potassium chloride CR (Klor-Con M20) 20 mEq ER tablet    Basic Metabolic Panel      2. Former smoker        3. BMI 28.0-28.9,adult        4. Coronary artery disease involving native coronary artery of native heart without angina pectoris        5. Dyslipidemia                 Scribe Attestation  By signing my name below, Gladis JERONIMO LPN, Scribe   attest that this documentation has been prepared under the direction and in the presence of Gogo Chapman MD.     Provider Attestation - Scribe documentation    All medical record entries made by the Scribe were at my direction and personally dictated by me. I have reviewed the chart and agree that the record accurately reflects my personal performance of the history, physical exam, discussion and plan.

## 2025-02-10 NOTE — PROGRESS NOTES
"Subjective   Luis Armando Baez is a 54 y.o. male       Chief Complaint    Follow-up          HPI   Patient is in the office for follow-up for the problems noted below.  Since his last visit he underwent diagnostic cardiac catheterization October 2024 by myself which revealed mild to moderate disease with no interventions needed.  He remains slightly hypertensive and had to stop taking the clonidine and nifedipine because of side effects.  His cardiac pulm examinations were normal he does not have any symptoms.  He wishes to cut back on the potassium supplement since he takes 5 capsules daily.  He is trying to eat more vegetables and try to increase his oral intake of potassium.    Assessment/recommendations:     1-resistant hypertension on multiple medications currently out of control.  He demonstrated intolerance to a number of medications as noted.  He stop the nifedipine and the clonidine.  Will increase the valsartan up to 160 mg twice daily.  Will continue the rest of his medications and cut back on his potassium at his request and follow his labs  3-mild-moderate coronary artery disease by cardiac catheterization October 2024 with no interventions needed.  Aggressive modification of risk factor was discussed and applied.  He is currently on aspirin and high intensity statin  3-previous history of drug-induced gynecomastia which resolved by discontinuation of Aldactone, doing well on eplerenone  4-overweight, encouraged patient to maintain active lifestyle and cut back and calorie consumption  5-dyslipidemia on high intensity statin  Review of Systems   All other systems reviewed and are negative.           Vitals:    02/10/25 1611   BP: (!) 160/106   BP Location: Left arm   Patient Position: Sitting   Pulse: 82   Weight: 80.3 kg (177 lb)   Height: 1.676 m (5' 6\")        Objective   Physical Exam  Constitutional:       Appearance: Normal appearance.   HENT:      Nose: Nose normal.   Neck:      Vascular: No " carotid bruit.   Cardiovascular:      Rate and Rhythm: Normal rate.      Pulses: Normal pulses.      Heart sounds: Normal heart sounds.   Pulmonary:      Effort: Pulmonary effort is normal.   Abdominal:      General: Bowel sounds are normal.      Palpations: Abdomen is soft.   Musculoskeletal:         General: Normal range of motion.      Cervical back: Normal range of motion.      Right lower leg: No edema.      Left lower leg: No edema.   Skin:     General: Skin is warm and dry.   Neurological:      General: No focal deficit present.      Mental Status: He is alert.   Psychiatric:         Mood and Affect: Mood normal.         Behavior: Behavior normal.         Thought Content: Thought content normal.         Judgment: Judgment normal.         Allergies  Hydrocodone-acetaminophen and Spironolactone     Current Medications    Current Outpatient Medications:     cyanocobalamin (Vitamin B-12) 1,000 mcg tablet, Take 3 tablets (3,000 mcg) by mouth once daily., Disp: , Rfl:     cyclobenzaprine (Flexeril) 10 mg tablet, Take 1 tablet (10 mg) by mouth 2 times a day as needed for muscle spasms., Disp: , Rfl:     eplerenone (Inspra) 25 mg tablet, Take 2 tablets (50 mg) by mouth once daily., Disp: , Rfl:     hydrALAZINE (Apresoline) 50 mg tablet, Take 1 tablet (50 mg) by mouth 2 times a day., Disp: 180 tablet, Rfl: 3    indapamide (Lozol) 2.5 mg tablet, Take 1 tablet (2.5 mg) by mouth once daily in the morning., Disp: 90 tablet, Rfl: 3    magnesium glycinate 100 mg tablet, Take 200 mg by mouth once daily., Disp: , Rfl:     nebivolol (Bystolic) 10 mg tablet, Take 1 tablet (10 mg) by mouth once daily., Disp: 90 tablet, Rfl: 3    potassium chloride CR 20 mEq ER tablet, Take 5 tablets (100 mEq) by mouth once daily., Disp: 450 tablet, Rfl: 3    rosuvastatin (Crestor) 20 mg tablet, Take 1 tablet (20 mg) by mouth once daily., Disp: 90 tablet, Rfl: 3    zolpidem (Ambien) 5 mg tablet, Take 1 tablet (5 mg) by mouth once daily at  bedtime., Disp: , Rfl:     potassium chloride CR (Klor-Con M20) 20 mEq ER tablet, Take 3 tablets (60 mEq) by mouth once daily. Do not crush or chew., Disp: 270 tablet, Rfl: 3    valsartan (Diovan) 160 mg tablet, Take 1 tablet (160 mg) by mouth 2 times a day., Disp: 180 tablet, Rfl: 3                     Assessment/Plan   1. Resistant hypertension  Follow Up In Cardiology    valsartan (Diovan) 160 mg tablet    Basic Metabolic Panel    Follow Up In Cardiology    potassium chloride CR (Klor-Con M20) 20 mEq ER tablet    Basic Metabolic Panel      2. Former smoker        3. BMI 28.0-28.9,adult        4. Coronary artery disease involving native coronary artery of native heart without angina pectoris        5. Dyslipidemia                 Scribe Attestation  By signing my name below, Gladis JERONIMO LPN, Scribe   attest that this documentation has been prepared under the direction and in the presence of Gogo Chapman MD.     Provider Attestation - Scribe documentation    All medical record entries made by the Scribe were at my direction and personally dictated by me. I have reviewed the chart and agree that the record accurately reflects my personal performance of the history, physical exam, discussion and plan.

## 2025-02-10 NOTE — PATIENT INSTRUCTIONS
Please bring all medicines, vitamins, and herbal supplements with you when you come to the office.    Prescriptions will not be filled unless you are compliant with your follow up appointments or have a follow up appointment scheduled as per instruction of your physician. Refills should be requested at the time of your visit.     BMI was above normal measurement. Current weight: 80.3 kg (177 lb)  Weight change since last visit (-) denotes wt loss 5 lbs   Weight loss needed to achieve BMI 25: 22.4 Lbs  Weight loss needed to achieve BMI 30: -8.5 Lbs  Provided instructions on dietary changes.    Valsartan 160 mg one tablet two times daily  Potassium cl 20 meq 3 tablets daily  Lab work one month

## 2025-02-11 ENCOUNTER — TELEPHONE (OUTPATIENT)
Dept: CARDIOLOGY | Facility: CLINIC | Age: 55
End: 2025-02-11
Payer: COMMERCIAL

## 2025-02-11 NOTE — TELEPHONE ENCOUNTER
Received call from daughter, Bhumi, with concerns regarding her father's blood pressure. She states last night his bp was 205/125 and today it was 206/126. She states she told her dad to go to the ER but he was refusing and stated he was going to drive to our office. She reports he was complaining of a headache.     Attempted to phone patient to advise on going to ER, unable to reach. Detailed message left     Bhumi: [phone]

## 2025-02-11 NOTE — TELEPHONE ENCOUNTER
Pt phones back, states last night his bp was 206/126. He woke in the middle of the night with an extreme headache. He then went back to bed and woke again with severely elevated bp but no headache. Patient reports he took his bp 20 mins ago and it was 205/128. He denies any current symptoms.     Advised pt of need to go to ER due to severely elevated bp and increased risk of stroke. He verbalized understanding.     Daughter updated

## 2025-03-14 ENCOUNTER — APPOINTMENT (OUTPATIENT)
Dept: CARDIOLOGY | Facility: CLINIC | Age: 55
End: 2025-03-14
Payer: COMMERCIAL

## 2025-03-14 VITALS
WEIGHT: 175 LBS | DIASTOLIC BLOOD PRESSURE: 72 MMHG | HEART RATE: 68 BPM | BODY MASS INDEX: 28.12 KG/M2 | HEIGHT: 66 IN | SYSTOLIC BLOOD PRESSURE: 128 MMHG

## 2025-03-14 DIAGNOSIS — Z87.891 FORMER SMOKER: ICD-10-CM

## 2025-03-14 DIAGNOSIS — E87.6 HYPOKALEMIA: ICD-10-CM

## 2025-03-14 DIAGNOSIS — I1A.0 RESISTANT HYPERTENSION: ICD-10-CM

## 2025-03-14 PROCEDURE — 1036F TOBACCO NON-USER: CPT | Performed by: INTERNAL MEDICINE

## 2025-03-14 PROCEDURE — 3008F BODY MASS INDEX DOCD: CPT | Performed by: INTERNAL MEDICINE

## 2025-03-14 PROCEDURE — 3078F DIAST BP <80 MM HG: CPT | Performed by: INTERNAL MEDICINE

## 2025-03-14 PROCEDURE — 3074F SYST BP LT 130 MM HG: CPT | Performed by: INTERNAL MEDICINE

## 2025-03-14 PROCEDURE — 99212 OFFICE O/P EST SF 10 MIN: CPT | Performed by: INTERNAL MEDICINE

## 2025-03-14 NOTE — LETTER
"March 14, 2025     Maria Antonia Hemphill, APRN-CNP  [address] [place] [address]    Patient: Luis Armando Baez   YOB: 1970   Date of Visit: 3/14/2025       Dear Dr. Maria Antonia Hemphill, APRN-CNP:    Thank you for referring Luis Armando Baez to me for evaluation. Below are my notes for this consultation.  If you have questions, please do not hesitate to call me. I look forward to following your patient along with you.       Sincerely,     Gogo Chapman MD      CC: No Recipients  ______________________________________________________________________________________    Chief Complaint   Patient presents with   • Blood Pressure Check     Hypertension       Subjective   Luis Armando Baez is a 54 y.o. male     HPI   Patient is in the office for hypertension management.  Recently had a visit to the emergency department for severe hypertension his evaluation was benign except for hypertension and hypokalemia.  No changes in his medication were made.  I was contacted after the visit and recommended changes to his medications and fortunately his pressure has normalized and he seems to be able to live with a complex medical regimen he is on.  He was encouraged to increase his oral intake of natural potassium in addition to his potassium supplements and will check his potassium level in couple of weeks.  His present regimen for hypertension will be left unchanged.  Review of Systems   All other systems reviewed and are negative.           Vitals:    03/14/25 0940 03/14/25 0945   BP: 132/78 128/72   BP Location: Right arm Left arm   Patient Position: Sitting Sitting   Pulse: 68    Weight: 79.4 kg (175 lb)    Height: 1.676 m (5' 6\")         Objective   Physical Exam  Constitutional:       Appearance: Normal appearance.   HENT:      Nose: Nose normal.   Neck:      Vascular: No carotid bruit.   Cardiovascular:      Rate and Rhythm: Normal rate.      Pulses: Normal pulses.      Heart sounds: Normal heart " sounds.   Pulmonary:      Effort: Pulmonary effort is normal.   Abdominal:      General: Bowel sounds are normal.      Palpations: Abdomen is soft.   Musculoskeletal:         General: Normal range of motion.      Cervical back: Normal range of motion.      Right lower leg: No edema.      Left lower leg: No edema.   Skin:     General: Skin is warm and dry.   Neurological:      General: No focal deficit present.      Mental Status: He is alert.   Psychiatric:         Mood and Affect: Mood normal.         Behavior: Behavior normal.         Thought Content: Thought content normal.         Judgment: Judgment normal.         Allergies  Hydrocodone-acetaminophen and Spironolactone     Current Medications    Current Outpatient Medications:   •  amLODIPine (Norvasc) 5 mg tablet, Take 1 tablet (5 mg) by mouth once daily., Disp: 90 tablet, Rfl: 3  •  cyanocobalamin (Vitamin B-12) 1,000 mcg tablet, Take 3 tablets (3,000 mcg) by mouth once daily., Disp: , Rfl:   •  cyclobenzaprine (Flexeril) 10 mg tablet, Take 1 tablet (10 mg) by mouth 2 times a day as needed for muscle spasms., Disp: , Rfl:   •  eplerenone (Inspra) 25 mg tablet, Take 2 tablets (50 mg) by mouth once daily., Disp: , Rfl:   •  hydrALAZINE (Apresoline) 100 mg tablet, Take 1 tablet (100 mg) by mouth 2 times a day., Disp: 180 tablet, Rfl: 3  •  indapamide (Lozol) 2.5 mg tablet, Take 1 tablet (2.5 mg) by mouth once daily in the morning., Disp: 90 tablet, Rfl: 3  •  nebivolol (Bystolic) 20 mg tablet, Take 1 tablet (20 mg) by mouth once daily., Disp: 90 tablet, Rfl: 3  •  potassium chloride CR (Klor-Con M20) 20 mEq ER tablet, Take 3 tablets (60 mEq) by mouth once daily. Do not crush or chew., Disp: 270 tablet, Rfl: 3  •  rosuvastatin (Crestor) 20 mg tablet, Take 1 tablet (20 mg) by mouth once daily., Disp: 90 tablet, Rfl: 3  •  valsartan (Diovan) 160 mg tablet, Take 1 tablet (160 mg) by mouth 2 times a day., Disp: 180 tablet, Rfl: 3  •  zolpidem (Ambien) 5 mg tablet,  Take 1 tablet (5 mg) by mouth once daily at bedtime., Disp: , Rfl:   •  potassium chloride CR 20 mEq ER tablet, Take 5 tablets (100 mEq) by mouth once daily. (Patient not taking: Reported on 3/14/2025), Disp: 450 tablet, Rfl: 3                     Assessment/Plan   1. Resistant hypertension  Follow Up In Cardiology      2. Former smoker        3. BMI 28.0-28.9,adult        4. Hypokalemia  Potassium    Potassium               Scribe Attestation  By signing my name below, IKyleigh LPN, Scribe   attest that this documentation has been prepared under the direction and in the presence of Gogo Chapman MD.     Provider Attestation - Scribe documentation    All medical record entries made by the Scribe were at my direction and personally dictated by me. I have reviewed the chart and agree that the record accurately reflects my personal performance of the history, physical exam, discussion and plan.

## 2025-03-14 NOTE — PROGRESS NOTES
"Chief Complaint   Patient presents with    Blood Pressure Check     Hypertension       Subjective   Luis Armando Baez is a 54 y.o. male     HPI   Patient is in the office for hypertension management.  Recently had a visit to the emergency department for severe hypertension his evaluation was benign except for hypertension and hypokalemia.  No changes in his medication were made.  I was contacted after the visit and recommended changes to his medications and fortunately his pressure has normalized and he seems to be able to live with a complex medical regimen he is on.  He was encouraged to increase his oral intake of natural potassium in addition to his potassium supplements and will check his potassium level in couple of weeks.  His present regimen for hypertension will be left unchanged.  Review of Systems   All other systems reviewed and are negative.           Vitals:    03/14/25 0940 03/14/25 0945   BP: 132/78 128/72   BP Location: Right arm Left arm   Patient Position: Sitting Sitting   Pulse: 68    Weight: 79.4 kg (175 lb)    Height: 1.676 m (5' 6\")         Objective   Physical Exam  Constitutional:       Appearance: Normal appearance.   HENT:      Nose: Nose normal.   Neck:      Vascular: No carotid bruit.   Cardiovascular:      Rate and Rhythm: Normal rate.      Pulses: Normal pulses.      Heart sounds: Normal heart sounds.   Pulmonary:      Effort: Pulmonary effort is normal.   Abdominal:      General: Bowel sounds are normal.      Palpations: Abdomen is soft.   Musculoskeletal:         General: Normal range of motion.      Cervical back: Normal range of motion.      Right lower leg: No edema.      Left lower leg: No edema.   Skin:     General: Skin is warm and dry.   Neurological:      General: No focal deficit present.      Mental Status: He is alert.   Psychiatric:         Mood and Affect: Mood normal.         Behavior: Behavior normal.         Thought Content: Thought content normal.         Judgment: " Judgment normal.         Allergies  Hydrocodone-acetaminophen and Spironolactone     Current Medications    Current Outpatient Medications:     amLODIPine (Norvasc) 5 mg tablet, Take 1 tablet (5 mg) by mouth once daily., Disp: 90 tablet, Rfl: 3    cyanocobalamin (Vitamin B-12) 1,000 mcg tablet, Take 3 tablets (3,000 mcg) by mouth once daily., Disp: , Rfl:     cyclobenzaprine (Flexeril) 10 mg tablet, Take 1 tablet (10 mg) by mouth 2 times a day as needed for muscle spasms., Disp: , Rfl:     eplerenone (Inspra) 25 mg tablet, Take 2 tablets (50 mg) by mouth once daily., Disp: , Rfl:     hydrALAZINE (Apresoline) 100 mg tablet, Take 1 tablet (100 mg) by mouth 2 times a day., Disp: 180 tablet, Rfl: 3    indapamide (Lozol) 2.5 mg tablet, Take 1 tablet (2.5 mg) by mouth once daily in the morning., Disp: 90 tablet, Rfl: 3    nebivolol (Bystolic) 20 mg tablet, Take 1 tablet (20 mg) by mouth once daily., Disp: 90 tablet, Rfl: 3    potassium chloride CR (Klor-Con M20) 20 mEq ER tablet, Take 3 tablets (60 mEq) by mouth once daily. Do not crush or chew., Disp: 270 tablet, Rfl: 3    rosuvastatin (Crestor) 20 mg tablet, Take 1 tablet (20 mg) by mouth once daily., Disp: 90 tablet, Rfl: 3    valsartan (Diovan) 160 mg tablet, Take 1 tablet (160 mg) by mouth 2 times a day., Disp: 180 tablet, Rfl: 3    zolpidem (Ambien) 5 mg tablet, Take 1 tablet (5 mg) by mouth once daily at bedtime., Disp: , Rfl:     potassium chloride CR 20 mEq ER tablet, Take 5 tablets (100 mEq) by mouth once daily. (Patient not taking: Reported on 3/14/2025), Disp: 450 tablet, Rfl: 3                     Assessment/Plan   1. Resistant hypertension  Follow Up In Cardiology      2. Former smoker        3. BMI 28.0-28.9,adult        4. Hypokalemia  Potassium    Potassium               Scribe Attestation  By signing my name below, Kyleigh JERONIMO LPN, Scrfreddy   attest that this documentation has been prepared under the direction and in the presence of Gogo Chapman MD.      Provider Attestation - Scribe documentation    All medical record entries made by the Scribe were at my direction and personally dictated by me. I have reviewed the chart and agree that the record accurately reflects my personal performance of the history, physical exam, discussion and plan.

## 2025-03-14 NOTE — PATIENT INSTRUCTIONS
Please bring all medicines, vitamins, and herbal supplements with you when you come to the office.    Prescriptions will not be filled unless you are compliant with your follow up appointments or have a follow up appointment scheduled as per instruction of your physician. Refills should be requested at the time of your visit.     BMI was above normal measurement. Current weight: 79.4 kg (175 lb)  Weight change since last visit (-) denotes wt loss -2 lbs   Weight loss needed to achieve BMI 25: 20.4 Lbs  Weight loss needed to achieve BMI 30: -10.5 Lbs  Provided instructions on dietary changes  Provided instructions on exercise.

## 2025-03-17 ENCOUNTER — TELEPHONE (OUTPATIENT)
Dept: CARDIOLOGY | Facility: CLINIC | Age: 55
End: 2025-03-17
Payer: COMMERCIAL

## 2025-03-17 DIAGNOSIS — E87.6 HYPOKALEMIA: ICD-10-CM

## 2025-03-17 DIAGNOSIS — I1A.0 RESISTANT HYPERTENSION: ICD-10-CM

## 2025-03-17 LAB — NON-UH HIE POTASSIUM: 3.1 MMOL/L (ref 3.5–5.1)

## 2025-03-17 RX ORDER — POTASSIUM CHLORIDE 20 MEQ/1
80 TABLET, EXTENDED RELEASE ORAL DAILY
Qty: 360 TABLET | Refills: 3 | Status: SHIPPED | OUTPATIENT
Start: 2025-03-17 | End: 2026-03-17

## 2025-03-17 NOTE — TELEPHONE ENCOUNTER
Informed patient of lab results and recommendations to increase potassium supplement as well as diet with potassium.     Orders prepped and sent to Malou Molina NP for signature     Once lab order is signed fax to [facility].

## 2025-03-17 NOTE — TELEPHONE ENCOUNTER
Result Communication    Resulted Orders   [facility] HIE Potassium   Result Value Ref Range    [facility] HIE Potassium 3.1 (L) 3.5 - 5.1 mmol/L      Comment:      PERFORMED BY:[facility] [place], [place] [id number] MEDICAL DIRECTORMOSOFY HAYES M.D.

## 2025-03-17 NOTE — TELEPHONE ENCOUNTER
----- Message from Malou Larose sent at 3/17/2025 12:53 PM EDT -----  His potassium remains low at 3.1  Take an additonal 40meq of potassium today   Increase KCL to total 80 meq daily  Increase potassium rich foods  Repeat K+ in 2 weeks  ----- Message -----  From: Nani Hardin - Lab Results In  Sent: 3/17/2025  12:07 PM EDT  To: Gogo Chapman MD

## 2025-03-28 LAB — NON-UH HIE POTASSIUM: 3.6 MMOL/L (ref 3.5–5.1)

## 2025-03-31 ENCOUNTER — TELEPHONE (OUTPATIENT)
Dept: CARDIOLOGY | Facility: CLINIC | Age: 55
End: 2025-03-31
Payer: COMMERCIAL

## 2025-03-31 NOTE — TELEPHONE ENCOUNTER
Result Communication    Resulted Orders   [facility] H[facility] Potassium   Result Value Ref Range    [facility] HIE Potassium 3.6 3.5 - 5.1 mmol/L      Comment:      PERFORMED BY:[facility] [place], [place] [id number] MEDICAL DIRECTORMOSOFY HAYES M.D.       3:27 PM      Results were successfully communicated with the patient and they acknowledged their understanding.     Non-Separation

## 2025-03-31 NOTE — TELEPHONE ENCOUNTER
----- Message from Malou Larose sent at 3/31/2025  8:38 AM EDT -----  His potassium is finally 3.6 - lower end of normal but much better then prior.  No changes needed.  ----- Message -----  From: Nani Hardin - Lab Results In  Sent: 3/28/2025  11:34 AM EDT  To: NEHA Britt-CNP

## 2025-04-24 DIAGNOSIS — I1A.0 RESISTANT HYPERTENSION: ICD-10-CM

## 2025-04-24 RX ORDER — INDAPAMIDE 2.5 MG/1
TABLET ORAL
Qty: 90 TABLET | Refills: 3 | Status: SHIPPED | OUTPATIENT
Start: 2025-04-24

## 2025-07-01 DIAGNOSIS — E78.5 HYPERLIPIDEMIA, UNSPECIFIED HYPERLIPIDEMIA TYPE: ICD-10-CM

## 2025-07-01 RX ORDER — ROSUVASTATIN CALCIUM 20 MG/1
20 TABLET, COATED ORAL DAILY
Qty: 90 TABLET | Refills: 3 | Status: SHIPPED | OUTPATIENT
Start: 2025-07-01

## 2025-07-29 DIAGNOSIS — I1A.0 RESISTANT HYPERTENSION: ICD-10-CM

## 2025-07-29 RX ORDER — AMLODIPINE BESYLATE 5 MG/1
5 TABLET ORAL DAILY
Qty: 90 TABLET | Refills: 3 | Status: SHIPPED | OUTPATIENT
Start: 2025-07-29

## 2025-10-08 ENCOUNTER — APPOINTMENT (OUTPATIENT)
Dept: CARDIOLOGY | Facility: CLINIC | Age: 55
End: 2025-10-08
Payer: COMMERCIAL

## (undated) DEVICE — CONNECTOR,T,STERILE: Brand: MEDLINE

## (undated) DEVICE — HYPODERMIC SAFETY NEEDLE: Brand: MAGELLAN

## (undated) DEVICE — PADDING CAST W6INXL4YD RAYON UNDERCAST SOF-ROL

## (undated) DEVICE — GOWN,AURORA,NONREINFORCED,LARGE: Brand: MEDLINE

## (undated) DEVICE — GLOVE ORANGE PI 7   MSG9070

## (undated) DEVICE — MAT FLOOR ULTRA ABS 28X48IN

## (undated) DEVICE — CHLORAPREP 26ML ORANGE

## (undated) DEVICE — INTENDED FOR TISSUE SEPARATION, AND OTHER PROCEDURES THAT REQUIRE A SHARP SURGICAL BLADE TO PUNCTURE OR CUT.: Brand: BARD-PARKER ® CARBON RIB-BACK BLADES

## (undated) DEVICE — GOWN,SIRUS,POLYRNF,BRTHSLV,XLN/XL,20/CS: Brand: MEDLINE

## (undated) DEVICE — SUTURE NONABSORBABLE MONOFILAMENT 3-0 PS-1 18 IN BLK ETHILON 1663H

## (undated) DEVICE — GLOVE ORANGE PI 7 1/2   MSG9075

## (undated) DEVICE — SPONGE GZ W4XL4IN COT 12 PLY TYP VII WVN C FLD DSGN

## (undated) DEVICE — SPONGE,LAP,18"X18",DLX,XR,ST,5/PK,40/PK: Brand: MEDLINE

## (undated) DEVICE — TUBING PMP L8FT LNG W/ CONN FOR AR-6400 REDEUCE

## (undated) DEVICE — 35 ML SYRINGE LUER-LOCK TIP: Brand: MONOJECT

## (undated) DEVICE — BANDAGE COMPR W4INXL5YD BGE HI E W/ REM CLP SURE-WRAP

## (undated) DEVICE — NEEDLE SPNL 18GA L3.5IN W/ QNCKE SHARPER BVL DURA CLICK

## (undated) DEVICE — PACK ARTHRO III ST SIRUS

## (undated) DEVICE — COUNTER NDL 40 COUNT HLD 70 FOAM BLK ADH W/ MAG

## (undated) DEVICE — [AGGRESSIVE PLUS CUTTER, ARTHROSCOPIC SHAVER BLADE,  DO NOT RESTERILIZE,  DO NOT USE IF PACKAGE IS DAMAGED,  KEEP DRY,  KEEP AWAY FROM SUNLIGHT]: Brand: FORMULA

## (undated) DEVICE — PAD,ABDOMINAL,8"X10",ST,LF: Brand: MEDLINE

## (undated) DEVICE — DRESSING GZ W1XL8IN COT XRFRM N ADH OVERWRAP CURAD

## (undated) DEVICE — TUBING, SUCTION, 1/4" X 10', STRAIGHT: Brand: MEDLINE

## (undated) DEVICE — BANDAGE COMPR W6INXL5YD HI E BGE W/ CLP SURE-WRAP

## (undated) DEVICE — 3M™ STERI-DRAPE™ U-DRAPE 1015: Brand: STERI-DRAPE™